# Patient Record
Sex: FEMALE | Race: OTHER | HISPANIC OR LATINO
[De-identification: names, ages, dates, MRNs, and addresses within clinical notes are randomized per-mention and may not be internally consistent; named-entity substitution may affect disease eponyms.]

---

## 2017-06-28 PROBLEM — Z00.00 ENCOUNTER FOR PREVENTIVE HEALTH EXAMINATION: Status: ACTIVE | Noted: 2017-06-28

## 2017-07-12 ENCOUNTER — APPOINTMENT (OUTPATIENT)
Dept: SPINE | Facility: CLINIC | Age: 42
End: 2017-07-12

## 2017-07-12 VITALS
HEART RATE: 71 BPM | BODY MASS INDEX: 36.24 KG/M2 | WEIGHT: 168 LBS | SYSTOLIC BLOOD PRESSURE: 113 MMHG | DIASTOLIC BLOOD PRESSURE: 75 MMHG | HEIGHT: 57 IN

## 2017-07-12 DIAGNOSIS — Z78.9 OTHER SPECIFIED HEALTH STATUS: ICD-10-CM

## 2017-07-26 ENCOUNTER — APPOINTMENT (OUTPATIENT)
Dept: SPINE | Facility: CLINIC | Age: 42
End: 2017-07-26

## 2017-07-26 VITALS
SYSTOLIC BLOOD PRESSURE: 99 MMHG | HEART RATE: 66 BPM | HEIGHT: 57 IN | DIASTOLIC BLOOD PRESSURE: 66 MMHG | WEIGHT: 170 LBS | BODY MASS INDEX: 36.68 KG/M2

## 2017-08-11 ENCOUNTER — OUTPATIENT (OUTPATIENT)
Dept: OUTPATIENT SERVICES | Facility: HOSPITAL | Age: 42
LOS: 1 days | End: 2017-08-11
Payer: COMMERCIAL

## 2017-08-11 ENCOUNTER — APPOINTMENT (OUTPATIENT)
Dept: MRI IMAGING | Facility: IMAGING CENTER | Age: 42
End: 2017-08-11
Payer: COMMERCIAL

## 2017-08-11 DIAGNOSIS — G93.0 CEREBRAL CYSTS: ICD-10-CM

## 2017-08-11 PROCEDURE — 70553 MRI BRAIN STEM W/O & W/DYE: CPT | Mod: 26

## 2017-08-11 PROCEDURE — 70553 MRI BRAIN STEM W/O & W/DYE: CPT

## 2017-08-11 PROCEDURE — A9585: CPT

## 2017-08-29 ENCOUNTER — EMERGENCY (EMERGENCY)
Facility: HOSPITAL | Age: 42
LOS: 1 days | Discharge: HOME | End: 2017-08-29
Attending: EMERGENCY MEDICINE | Admitting: EMERGENCY MEDICINE
Payer: MEDICAID

## 2017-08-29 VITALS
OXYGEN SATURATION: 100 % | RESPIRATION RATE: 16 BRPM | SYSTOLIC BLOOD PRESSURE: 112 MMHG | HEART RATE: 88 BPM | DIASTOLIC BLOOD PRESSURE: 65 MMHG

## 2017-08-29 VITALS
HEART RATE: 80 BPM | RESPIRATION RATE: 18 BRPM | TEMPERATURE: 99 F | DIASTOLIC BLOOD PRESSURE: 86 MMHG | SYSTOLIC BLOOD PRESSURE: 125 MMHG | OXYGEN SATURATION: 98 %

## 2017-08-29 DIAGNOSIS — G50.0 TRIGEMINAL NEURALGIA: ICD-10-CM

## 2017-08-29 LAB
ALBUMIN SERPL ELPH-MCNC: 4.6 G/DL — SIGNIFICANT CHANGE UP (ref 3.3–5)
ALP SERPL-CCNC: 85 U/L — SIGNIFICANT CHANGE UP (ref 40–120)
ALT FLD-CCNC: 16 U/L — SIGNIFICANT CHANGE UP (ref 4–33)
APTT BLD: 32.6 SEC — SIGNIFICANT CHANGE UP (ref 27.5–37.4)
AST SERPL-CCNC: 20 U/L — SIGNIFICANT CHANGE UP (ref 4–32)
BASOPHILS # BLD AUTO: 0.01 K/UL — SIGNIFICANT CHANGE UP (ref 0–0.2)
BASOPHILS NFR BLD AUTO: 0.1 % — SIGNIFICANT CHANGE UP (ref 0–2)
BILIRUB SERPL-MCNC: < 0.2 MG/DL — LOW (ref 0.2–1.2)
BUN SERPL-MCNC: 6 MG/DL — LOW (ref 7–23)
CALCIUM SERPL-MCNC: 9.6 MG/DL — SIGNIFICANT CHANGE UP (ref 8.4–10.5)
CHLORIDE SERPL-SCNC: 100 MMOL/L — SIGNIFICANT CHANGE UP (ref 98–107)
CO2 SERPL-SCNC: 25 MMOL/L — SIGNIFICANT CHANGE UP (ref 22–31)
CREAT SERPL-MCNC: 0.67 MG/DL — SIGNIFICANT CHANGE UP (ref 0.5–1.3)
EOSINOPHIL # BLD AUTO: 0.09 K/UL — SIGNIFICANT CHANGE UP (ref 0–0.5)
EOSINOPHIL NFR BLD AUTO: 1.1 % — SIGNIFICANT CHANGE UP (ref 0–6)
GLUCOSE SERPL-MCNC: 95 MG/DL — SIGNIFICANT CHANGE UP (ref 70–99)
HCT VFR BLD CALC: 38.2 % — SIGNIFICANT CHANGE UP (ref 34.5–45)
HGB BLD-MCNC: 12.1 G/DL — SIGNIFICANT CHANGE UP (ref 11.5–15.5)
IMM GRANULOCYTES # BLD AUTO: 0.02 # — SIGNIFICANT CHANGE UP
IMM GRANULOCYTES NFR BLD AUTO: 0.2 % — SIGNIFICANT CHANGE UP (ref 0–1.5)
INR BLD: 1.11 — SIGNIFICANT CHANGE UP (ref 0.88–1.17)
LYMPHOCYTES # BLD AUTO: 1.68 K/UL — SIGNIFICANT CHANGE UP (ref 1–3.3)
LYMPHOCYTES # BLD AUTO: 20.7 % — SIGNIFICANT CHANGE UP (ref 13–44)
MAGNESIUM SERPL-MCNC: 2.1 MG/DL — SIGNIFICANT CHANGE UP (ref 1.6–2.6)
MCHC RBC-ENTMCNC: 25.4 PG — LOW (ref 27–34)
MCHC RBC-ENTMCNC: 31.7 % — LOW (ref 32–36)
MCV RBC AUTO: 80.1 FL — SIGNIFICANT CHANGE UP (ref 80–100)
MONOCYTES # BLD AUTO: 0.39 K/UL — SIGNIFICANT CHANGE UP (ref 0–0.9)
MONOCYTES NFR BLD AUTO: 4.8 % — SIGNIFICANT CHANGE UP (ref 2–14)
NEUTROPHILS # BLD AUTO: 5.92 K/UL — SIGNIFICANT CHANGE UP (ref 1.8–7.4)
NEUTROPHILS NFR BLD AUTO: 73.1 % — SIGNIFICANT CHANGE UP (ref 43–77)
NRBC # FLD: 0 — SIGNIFICANT CHANGE UP
PHOSPHATE SERPL-MCNC: 3.2 MG/DL — SIGNIFICANT CHANGE UP (ref 2.5–4.5)
PLATELET # BLD AUTO: 299 K/UL — SIGNIFICANT CHANGE UP (ref 150–400)
PMV BLD: 10.8 FL — SIGNIFICANT CHANGE UP (ref 7–13)
POTASSIUM SERPL-MCNC: 3.5 MMOL/L — SIGNIFICANT CHANGE UP (ref 3.5–5.3)
POTASSIUM SERPL-SCNC: 3.5 MMOL/L — SIGNIFICANT CHANGE UP (ref 3.5–5.3)
PROT SERPL-MCNC: 8.9 G/DL — HIGH (ref 6–8.3)
PROTHROM AB SERPL-ACNC: 12.5 SEC — SIGNIFICANT CHANGE UP (ref 9.8–13.1)
RBC # BLD: 4.77 M/UL — SIGNIFICANT CHANGE UP (ref 3.8–5.2)
RBC # FLD: 15.9 % — HIGH (ref 10.3–14.5)
SODIUM SERPL-SCNC: 140 MMOL/L — SIGNIFICANT CHANGE UP (ref 135–145)
WBC # BLD: 8.11 K/UL — SIGNIFICANT CHANGE UP (ref 3.8–10.5)
WBC # FLD AUTO: 8.11 K/UL — SIGNIFICANT CHANGE UP (ref 3.8–10.5)

## 2017-08-29 PROCEDURE — 99285 EMERGENCY DEPT VISIT HI MDM: CPT

## 2017-08-29 RX ORDER — SODIUM CHLORIDE 9 MG/ML
1000 INJECTION INTRAMUSCULAR; INTRAVENOUS; SUBCUTANEOUS ONCE
Qty: 0 | Refills: 0 | Status: COMPLETED | OUTPATIENT
Start: 2017-08-29 | End: 2017-08-29

## 2017-08-29 RX ORDER — METOCLOPRAMIDE HCL 10 MG
10 TABLET ORAL ONCE
Qty: 0 | Refills: 0 | Status: COMPLETED | OUTPATIENT
Start: 2017-08-29 | End: 2017-08-29

## 2017-08-29 RX ORDER — MORPHINE SULFATE 50 MG/1
4 CAPSULE, EXTENDED RELEASE ORAL ONCE
Qty: 0 | Refills: 0 | Status: DISCONTINUED | OUTPATIENT
Start: 2017-08-29 | End: 2017-08-29

## 2017-08-29 RX ORDER — GABAPENTIN 400 MG/1
1 CAPSULE ORAL
Qty: 15 | Refills: 0 | OUTPATIENT
Start: 2017-08-29 | End: 2017-09-13

## 2017-08-29 RX ORDER — KETOROLAC TROMETHAMINE 30 MG/ML
15 SYRINGE (ML) INJECTION ONCE
Qty: 0 | Refills: 0 | Status: DISCONTINUED | OUTPATIENT
Start: 2017-08-29 | End: 2017-08-29

## 2017-08-29 RX ORDER — GABAPENTIN 400 MG/1
1 CAPSULE ORAL
Qty: 30 | Refills: 0 | OUTPATIENT
Start: 2017-08-29 | End: 2017-09-08

## 2017-08-29 RX ORDER — ACETAMINOPHEN 500 MG
650 TABLET ORAL ONCE
Qty: 0 | Refills: 0 | Status: COMPLETED | OUTPATIENT
Start: 2017-08-29 | End: 2017-08-29

## 2017-08-29 RX ADMIN — Medication 15 MILLIGRAM(S): at 15:39

## 2017-08-29 RX ADMIN — MORPHINE SULFATE 4 MILLIGRAM(S): 50 CAPSULE, EXTENDED RELEASE ORAL at 16:17

## 2017-08-29 RX ADMIN — Medication 650 MILLIGRAM(S): at 16:17

## 2017-08-29 RX ADMIN — Medication 10 MILLIGRAM(S): at 15:40

## 2017-08-29 RX ADMIN — SODIUM CHLORIDE 1000 MILLILITER(S): 9 INJECTION INTRAMUSCULAR; INTRAVENOUS; SUBCUTANEOUS at 15:40

## 2017-08-29 RX ADMIN — Medication 650 MILLIGRAM(S): at 15:39

## 2017-08-29 RX ADMIN — MORPHINE SULFATE 4 MILLIGRAM(S): 50 CAPSULE, EXTENDED RELEASE ORAL at 16:43

## 2017-08-29 RX ADMIN — Medication 15 MILLIGRAM(S): at 16:17

## 2017-08-29 NOTE — CONSULT NOTE ADULT - PROBLEM SELECTOR RECOMMENDATION 9
Increase gabapentin to 600 mg PO qhs as tolerated  Outpatient follow up with private neurologist, or in our neurology clinic: [phone]

## 2017-08-29 NOTE — ED PROVIDER NOTE - MEDICAL DECISION MAKING DETAILS
40y/o F with recently diagnosed brain mass p/w right jaw pain and headache.  Pt states she has been having increased pain in the Right side of her face and jaw much worse with movement resulting in the inability to eat over the last 2 days. 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  Mass effect on the jaspreet and lateral displacement of the right fifth nerve.  cbc, cmp, ivf, ketorolac, reglan, tylenol   neruo surgery pa spoken with recommend outpaitnet follow up and pain control.

## 2017-08-29 NOTE — CONSULT NOTE ADULT - ASSESSMENT
41 year old female with epidermoid cyst (mass effect on jaspreet and lateral displacement of 5th nerve) presenting to the ED with right facial pain for the past year which is significantly worse this month. She describes it as a constant, electrical pain which starts anterior to the tragus to the forehead, cheek, and down her jawline. Sometimes the pain is felt periorally on the right. Pain is worse when she talks or eats. Patient was given morphine by ED with pain improvement from 10/10 to 0/10. Pain likely secondary to trigeminal neuralgia from epidermoid cyst irritation of CN V.

## 2017-08-29 NOTE — ED PROVIDER NOTE - OBJECTIVE STATEMENT
41 year old female with a PMHx of brain tumor - compressing on trigeminal nerve pw right sided facial pain . 42y/o F with recently diagnosed brain mass p/w right jaw pain and headache.  Pt states she has been having increased pain in the Right side of her face and jaw much worse with movement resulting in the inability to eat over the last 2 days. 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  Mass effect on the jaspreet and lateral displacement of the right fifth nerve.

## 2017-08-29 NOTE — CONSULT NOTE ADULT - SUBJECTIVE AND OBJECTIVE BOX
Neurology Consult    Reason for consult: Right facial pain    HPI: Patient is a 41 year old female presenting to the ED with right facial pain for the past year. Patient has an epidermoid cyst which wraps around the right portion of the brainstem. Mass effect on the jaspreet and lateral displacement of the right fifth nerve. She states starting August 2, 2017 the pain has become significantly worse. She describes it as a constant, electrical pain which starts anterior to the tragus to the forehead, cheek, and down her jawline. Sometimes the pain is felt periorally on the right. Pain is worse when she talks or eats. She denies headache, changes in vision, changes in hearing, fever, chills, weight change. Patient was given morphine by ED with pain improvement from 10/10 to 0/10.    REVIEW OF SYSTEMS:  Constitutional: No fever, chills, fatigue, weakness.  Eyes: No eye pain, visual disturbances, or discharge.  ENT:  No difficulty hearing, tinnitus, vertigo. No sinus or throat pain.  Neck: No pain or stiffness.  Respiratory: No cough, dyspnea, wheezing.  Cardiovascular: No chest pain, palpitations.  Gastrointestinal: No abdominal pain. No nausea, vomiting, diarrhea, or constipation.   Genitourinary: No dysuria, frequency, hematuria or incontinence.  Neurological: Shooting pain on right face. No headaches, lightheadedness, vertigo, numbness or tremors.  Psychiatric: No depression, anxiety, mood swings, or difficulty sleeping.  Musculoskeletal: No joint pain or swelling. No muscle, back, or extremity pain.  Skin: No itching, burning, rashes or lesions.   Lymph Nodes: No enlarged glands  Endocrine: No heat or cold intolerance, no hair loss.  Allergy and Immunologic: No hives or eczema.    MEDICATIONS  Gabapentin 400 mg PO QHS    PMH: Epidermoid cyst of brain    FAMILY HISTORY:  No history of dementia, strokes, or seizures     SOCIAL HISTORY:  No history of tobacco or alcohol use     Allergies  No Known Allergies    Vital Signs Last 24 Hrs  T(C): 37.1 (29 Aug 2017 14:24), Max: 37.3 (29 Aug 2017 13:41)  T(F): 98.7 (29 Aug 2017 14:24), Max: 99.2 (29 Aug 2017 13:41)  HR: 88 (29 Aug 2017 16:17) (80 - 100)  BP: 112/65 (29 Aug 2017 16:17) (112/65 - 134/61)  RR: 16 (29 Aug 2017 16:17) (16 - 18)  SpO2: 100% (29 Aug 2017 16:17) (98% - 100%)    Neurological Examination:    Mental Status: Patient is alert, awake, oriented X3. Patient is fluent, no dysarthria, no aphasia. Follows commands well and able to answer questions appropriately. Mood and affect normal.    Cranial Nerves: PERRL, EOMI, visual field intact, V1-V3 intact, no gross facial asymmetry, tongue/uvula midline. No tenderness on palpation of TMJ, V1-V3 distribution    Motor Exam:  Right upper extremity: 5/5  Left upper extremity: 5/5  Right lower extremity: 5/5  Left lower extremity: 5/5    Normal bulk/tone    Sensory: Intact to light touch and pinprick bilaterally    Coordination: Finger to nose intact bilaterally     Gait: Normal      Reflexes: 3+ Biceps, 3+ tricep 3+ Brachial, 3+ Patellar, 2+ Ankle  Plantar: Down bilateral    GENERAL: No acute distress  HEENT:  Normocephalic, atraumatic  MUSCULOSKELETAL: Normal range of motion  SKIN: No rashes    LABS:  CBC Full  -  ( 29 Aug 2017 14:15 )  WBC Count : 8.11 K/uL  Hemoglobin : 12.1 g/dL  Hematocrit : 38.2 %  Platelet Count - Automated : 299 K/uL  Mean Cell Volume : 80.1 fL  Mean Cell Hemoglobin : 25.4 pg  Mean Cell Hemoglobin Concentration : 31.7 %  Auto Neutrophil # : 5.92 K/uL  Auto Lymphocyte # : 1.68 K/uL  Auto Monocyte # : 0.39 K/uL  Auto Eosinophil # : 0.09 K/uL  Auto Basophil # : 0.01 K/uL  Auto Neutrophil % : 73.1 %  Auto Lymphocyte % : 20.7 %  Auto Monocyte % : 4.8 %  Auto Eosinophil % : 1.1 %  Auto Basophil % : 0.1 %    08-29    140  |  100  |  6<L>  ----------------------------<  95  3.5   |  25  |  0.67    Ca    9.6      29 Aug 2017 15:20  Phos  3.2     08-29  Mg     2.1     08-29    TPro  8.9<H>  /  Alb  4.6  /  TBili  < 0.2<L>  /  DBili  x   /  AST  20  /  ALT  16  /  AlkPhos  85  08-29    LIVER FUNCTIONS - ( 29 Aug 2017 15:20 )  Alb: 4.6 g/dL / Pro: 8.9 g/dL / ALK PHOS: 85 u/L / ALT: 16 u/L / AST: 20 u/L / GGT: x           PT/INR - ( 29 Aug 2017 15:20 )   PT: 12.5 SEC;   INR: 1.11        PTT - ( 29 Aug 2017 15:20 )  PTT:32.6 SEC

## 2017-08-29 NOTE — ED PROVIDER NOTE - PLAN OF CARE
Percocet 1 tablet every 6 hrs as needed for breakthrough discomfort- caution drowsiness while taking this medication- do not drive or operate heavy machinery. Take Gabapentin 600 mg every night. Follow up with outpatient private neurologist, or in our neurology clinic: [phone] within 1 week for further evaluation. Return to the Emergency Department for any new, worsening or concerning symptoms.

## 2017-08-29 NOTE — ED PROVIDER NOTE - CARE PLAN
Principal Discharge DX:	Trigeminal neuralgia  Instructions for follow-up, activity and diet:	Percocet 1 tablet every 6 hrs as needed for breakthrough discomfort- caution drowsiness while taking this medication- do not drive or operate heavy machinery. Take Gabapentin 600 mg every night. Follow up with outpatient private neurologist, or in our neurology clinic: [phone] within 1 week for further evaluation. Return to the Emergency Department for any new, worsening or concerning symptoms.  Secondary Diagnosis:	Brain mass Principal Discharge DX:	Trigeminal neuralgia  Instructions for follow-up, activity and diet:	Percocet 1 tablet every 6 hrs as needed for breakthrough discomfort- caution drowsiness while taking this medication- do not drive or operate heavy machinery. Take Gabapentin 600 mg every night. Follow up with outpatient private neurologist, or in our neurology clinic: [phone] within 1 week for further evaluation. Return to the Emergency Department for any new, worsening or concerning symptoms.  Secondary Diagnosis:	Brain mass

## 2017-08-29 NOTE — ED PROVIDER NOTE - NEUROLOGICAL, MLM
Alert and oriented, no focal deficits, no motor or sensory deficits. pain in right side of face with movement of jaw and with eating . no visible rash or ulceration

## 2017-08-29 NOTE — ED PROVIDER NOTE - CHIEF COMPLAINT
The patient is a 41y Female complaining of The patient is a 41y Female complaining of right facial pain

## 2017-08-29 NOTE — ED PROVIDER NOTE - PROGRESS NOTE DETAILS
GREG Denson: pt NAD, VSS, pt still having pain regardless of medication. Neuro will evaluate the patient shortly. GREG Denson: pt NAD, VSS, morphine helped the patient - currently feeling 0/10 pain. Pt tolerating PO. Wants to go home on PO meds. Neuro evaluated the pt - ok for dc - recommended increasing gabapentin to 600mg 3 times a day and f/u with neuro surgery

## 2017-08-29 NOTE — ED ADULT NURSE NOTE - OBJECTIVE STATEMENT
pt received md jae translating in Barbadian, pt states she had an MRI for a brain tumor that is causing pain to the right side of her face, worsened when opening her mouth, pt states the pain became too severe today and was unable to wait for her follow up appointment for MRI results, pt appears to be in NAD, vss as reported, will continue to monitor.

## 2017-08-29 NOTE — ED ADULT NURSE NOTE - CCCP TRG CHIEF CMPLNT
right side facial pain discomfort  sent for eval  of tumor on same side had MRI done ,here for eval of results of MRI

## 2017-08-29 NOTE — ED ADULT TRIAGE NOTE - CCCP TRG CHIEF CMPLNT
right side facial pain discomfort  sent for eval  of tumor on same side had MRI done here diagnostic purpose right side facial pain discomfort  sent for eval  of tumor on same side had MRI done ,here for eval of results of MRI  purpose right side facial pain discomfort  sent for eval  of tumor on same side had MRI done ,here for eval of results of MRI

## 2017-08-29 NOTE — ED PROVIDER NOTE - ATTENDING CONTRIBUTION TO CARE
DR. HAMILTON, ATTENDING MD-  I performed a face to face bedside interview with patient regarding history of present illness, review of symptoms and past medical history. I completed an independent physical exam.  I have discussed patient's plan of care with the resident.   Documentation as above in the note.    40 y/o female with known ic mass causing trigeminal neuralgia on right with c/o worsening pain over past few days.  Worse when trying to eat or speak, essentially any movement at her tmj.  Denies f/c, neck stiffness, cp, sob, cough, abd pain, n/v/d, dysuria, rash.  Afebrile, vs wnl, crying in pain, ncat, non ttp right face/tmj, ctabil, s1s2 rrr no m/r/g, abd soft non ttp no r/g, no cva tenderness b/l, no leg swelling b/l, no rash.  Pain control, po trial prior to dc, consult ns/neuro for recs re: pain control.

## 2017-08-29 NOTE — ED PROVIDER NOTE - ENMT, MLM
Airway patent, Nasal mucosa clear. Mouth with normal mucosa. Throat has no vesicles, no oropharyngeal exudates and uvula is midline. tenderness over right side of face over v2 trigeminal distribution

## 2017-09-13 ENCOUNTER — APPOINTMENT (OUTPATIENT)
Dept: SPINE | Facility: CLINIC | Age: 42
End: 2017-09-13
Payer: COMMERCIAL

## 2017-09-13 VITALS
HEART RATE: 76 BPM | HEIGHT: 57 IN | WEIGHT: 170 LBS | BODY MASS INDEX: 36.68 KG/M2 | DIASTOLIC BLOOD PRESSURE: 73 MMHG | SYSTOLIC BLOOD PRESSURE: 110 MMHG

## 2017-09-13 PROCEDURE — 99214 OFFICE O/P EST MOD 30 MIN: CPT

## 2017-09-13 RX ORDER — OXYCODONE AND ACETAMINOPHEN 5; 325 MG/1; MG/1
5-325 TABLET ORAL
Qty: 12 | Refills: 0 | Status: COMPLETED | COMMUNITY
Start: 2017-08-29

## 2017-09-13 RX ORDER — GABAPENTIN 300 MG/1
300 CAPSULE ORAL
Qty: 30 | Refills: 0 | Status: COMPLETED | COMMUNITY
Start: 2017-08-29

## 2017-10-17 ENCOUNTER — OUTPATIENT (OUTPATIENT)
Dept: OUTPATIENT SERVICES | Facility: HOSPITAL | Age: 42
LOS: 1 days | End: 2017-10-17
Payer: COMMERCIAL

## 2017-10-17 VITALS
TEMPERATURE: 98 F | OXYGEN SATURATION: 100 % | WEIGHT: 158.07 LBS | HEART RATE: 70 BPM | RESPIRATION RATE: 16 BRPM | DIASTOLIC BLOOD PRESSURE: 76 MMHG | HEIGHT: 57 IN | SYSTOLIC BLOOD PRESSURE: 112 MMHG

## 2017-10-17 DIAGNOSIS — G93.0 CEREBRAL CYSTS: ICD-10-CM

## 2017-10-17 DIAGNOSIS — Z01.818 ENCOUNTER FOR OTHER PREPROCEDURAL EXAMINATION: ICD-10-CM

## 2017-10-17 DIAGNOSIS — E03.9 HYPOTHYROIDISM, UNSPECIFIED: ICD-10-CM

## 2017-10-17 DIAGNOSIS — Z78.9 OTHER SPECIFIED HEALTH STATUS: ICD-10-CM

## 2017-10-17 LAB
BLD GP AB SCN SERPL QL: NEGATIVE — SIGNIFICANT CHANGE UP
RH IG SCN BLD-IMP: POSITIVE — SIGNIFICANT CHANGE UP

## 2017-10-17 PROCEDURE — G0463: CPT

## 2017-10-17 PROCEDURE — 86900 BLOOD TYPING SEROLOGIC ABO: CPT

## 2017-10-17 PROCEDURE — 86850 RBC ANTIBODY SCREEN: CPT

## 2017-10-17 PROCEDURE — 86901 BLOOD TYPING SEROLOGIC RH(D): CPT

## 2017-10-17 RX ORDER — CEFAZOLIN SODIUM 1 G
2000 VIAL (EA) INJECTION ONCE
Qty: 0 | Refills: 0 | Status: DISCONTINUED | OUTPATIENT
Start: 2017-10-24 | End: 2017-10-25

## 2017-10-17 NOTE — H&P PST ADULT - PMH
Epidermoid cyst of brain  Dx 5/2017 on MRI  Hypothyroid  Dx 10/2017  Iron deficiency anemia    Trigeminal neuralgia of right side of face

## 2017-10-17 NOTE — H&P PST ADULT - HISTORY OF PRESENT ILLNESS
42 yo Rwandan speaking female, reports pain on right side of face for five years, has had wisdom teeth extractions and other dental work without relief. Pt. 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 Pt. presents to PST today for Right Petrosal Craniotomy, removal of epidermoid, harvest of fat graft on 10/24/17. Pt. continues to have pain on right side of face, sometimes is hard to talk, denies recent fever, chills, chest pain, SOB, changes in bowel/urinary habits.  Pt. had a physical at PCP's office, lab work from 10/11/17 revealed a hypothyroid with a TSH level of 6.17 pt. started levothyroxine 50 mcg yesterday 10/16/17. Case d/w Dr. Gonzalez, left message at Dr. Thorpe's office, awaiting call back 40 yo [place] speaking female from [place], reports pain on right side of face for five years, has had wisdom teeth extractions and other dental work without relief. Pt. 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 Pt. presents to PST today for Right Petrosal Craniotomy, removal of epidermoid, harvest of fat graft on 10/24/17. Pt. continues to have pain on right side of face, sometimes is hard to talk, denies recent fever, chills, chest pain, SOB, changes in bowel/urinary habits.  Pt. had a physical at PCP's office, lab work from 10/11/17 revealed a TSH level of 6.17 pt. started levothyroxine 50 mcg yesterday 10/16/17. Spoke with VERONICA Bell to Dr. Thorpe, he is aware of TSH levels and that patient just started medication. Case d/w Dr. Gonzalez

## 2017-10-17 NOTE — H&P PST ADULT - NEGATIVE NEUROLOGICAL SYMPTOMS
no focal seizures/no weakness/no difficulty walking/no syncope/no tremors/no paresthesias/no loss of sensation/no transient paralysis/no vertigo

## 2017-10-17 NOTE — H&P PST ADULT - NSANTHOSAYNRD_GEN_A_CORE
No. BARBARA screening performed.  STOP BANG Legend: 0-2 = LOW Risk; 3-4 = INTERMEDIATE Risk; 5-8 = HIGH Risk

## 2017-10-17 NOTE — H&P PST ADULT - SOURCE OF INFORMATION, PROFILE
family/patient family/patient/Travon, spouse, present during PST visit. Pt. prefers I communicate in Sinhala during PST visit

## 2017-10-17 NOTE — H&P PST ADULT - PROBLEM SELECTOR PLAN 1
Right Petrosal Craniotomy Removal of Epidermoid, [place] of Fat Graft  Pre-op education, including Chlorhexidine soap, provided - all questions answered

## 2017-10-24 ENCOUNTER — INPATIENT (INPATIENT)
Facility: HOSPITAL | Age: 42
LOS: 6 days | Discharge: HOME | DRG: 27 | End: 2017-10-31
Attending: NEUROLOGICAL SURGERY | Admitting: NEUROLOGICAL SURGERY
Payer: COMMERCIAL

## 2017-10-24 ENCOUNTER — RESULT REVIEW (OUTPATIENT)
Age: 42
End: 2017-10-24

## 2017-10-24 ENCOUNTER — APPOINTMENT (OUTPATIENT)
Dept: SPINE | Facility: HOSPITAL | Age: 42
End: 2017-10-24

## 2017-10-24 VITALS
OXYGEN SATURATION: 100 % | WEIGHT: 158.07 LBS | TEMPERATURE: 98 F | HEART RATE: 83 BPM | SYSTOLIC BLOOD PRESSURE: 111 MMHG | RESPIRATION RATE: 18 BRPM | DIASTOLIC BLOOD PRESSURE: 71 MMHG | HEIGHT: 57 IN

## 2017-10-24 DIAGNOSIS — G93.0 CEREBRAL CYSTS: ICD-10-CM

## 2017-10-24 LAB
ANION GAP SERPL CALC-SCNC: 14 MMOL/L — SIGNIFICANT CHANGE UP (ref 5–17)
BUN SERPL-MCNC: 7 MG/DL — SIGNIFICANT CHANGE UP (ref 7–23)
CALCIUM SERPL-MCNC: 7.5 MG/DL — LOW (ref 8.4–10.5)
CHLORIDE SERPL-SCNC: 113 MMOL/L — HIGH (ref 96–108)
CO2 SERPL-SCNC: 21 MMOL/L — LOW (ref 22–31)
CREAT SERPL-MCNC: 0.61 MG/DL — SIGNIFICANT CHANGE UP (ref 0.5–1.3)
GLUCOSE SERPL-MCNC: 166 MG/DL — HIGH (ref 70–99)
HCG UR QL: NEGATIVE — SIGNIFICANT CHANGE UP
HCT VFR BLD CALC: 28.3 % — LOW (ref 34.5–45)
HGB BLD-MCNC: 9.1 G/DL — LOW (ref 11.5–15.5)
MAGNESIUM SERPL-MCNC: 1.7 MG/DL — SIGNIFICANT CHANGE UP (ref 1.6–2.6)
MCHC RBC-ENTMCNC: 26.7 PG — LOW (ref 27–34)
MCHC RBC-ENTMCNC: 32.3 GM/DL — SIGNIFICANT CHANGE UP (ref 32–36)
MCV RBC AUTO: 82.8 FL — SIGNIFICANT CHANGE UP (ref 80–100)
PHOSPHATE SERPL-MCNC: 3.1 MG/DL — SIGNIFICANT CHANGE UP (ref 2.5–4.5)
PLATELET # BLD AUTO: 314 K/UL — SIGNIFICANT CHANGE UP (ref 150–400)
POTASSIUM SERPL-MCNC: 3.6 MMOL/L — SIGNIFICANT CHANGE UP (ref 3.5–5.3)
POTASSIUM SERPL-SCNC: 3.6 MMOL/L — SIGNIFICANT CHANGE UP (ref 3.5–5.3)
RBC # BLD: 3.42 M/UL — LOW (ref 3.8–5.2)
RBC # FLD: 16.4 % — HIGH (ref 10.3–14.5)
RH IG SCN BLD-IMP: POSITIVE — SIGNIFICANT CHANGE UP
SODIUM SERPL-SCNC: 148 MMOL/L — HIGH (ref 135–145)
WBC # BLD: 15 K/UL — HIGH (ref 3.8–10.5)
WBC # FLD AUTO: 15 K/UL — HIGH (ref 3.8–10.5)

## 2017-10-24 PROCEDURE — 99291 CRITICAL CARE FIRST HOUR: CPT

## 2017-10-24 PROCEDURE — 61781 SCAN PROC CRANIAL INTRA: CPT

## 2017-10-24 PROCEDURE — 88307 TISSUE EXAM BY PATHOLOGIST: CPT | Mod: 26

## 2017-10-24 PROCEDURE — 61606 RESECT/EXCISE CRANIAL LESION: CPT

## 2017-10-24 PROCEDURE — 20926: CPT

## 2017-10-24 PROCEDURE — 61598 TRANSPETROSAL APPROACH/SKULL: CPT

## 2017-10-24 RX ORDER — TRAMADOL HYDROCHLORIDE 50 MG/1
1 TABLET ORAL
Qty: 0 | Refills: 0 | COMMUNITY

## 2017-10-24 RX ORDER — ONDANSETRON 8 MG/1
4 TABLET, FILM COATED ORAL EVERY 6 HOURS
Qty: 0 | Refills: 0 | Status: DISCONTINUED | OUTPATIENT
Start: 2017-10-24 | End: 2017-10-31

## 2017-10-24 RX ORDER — ONDANSETRON 8 MG/1
4 TABLET, FILM COATED ORAL ONCE
Qty: 0 | Refills: 0 | Status: COMPLETED | OUTPATIENT
Start: 2017-10-24 | End: 2017-10-24

## 2017-10-24 RX ORDER — CARBAMAZEPINE 200 MG
400 TABLET ORAL AT BEDTIME
Qty: 0 | Refills: 0 | Status: DISCONTINUED | OUTPATIENT
Start: 2017-10-24 | End: 2017-10-31

## 2017-10-24 RX ORDER — ACETAMINOPHEN 500 MG
650 TABLET ORAL EVERY 6 HOURS
Qty: 0 | Refills: 0 | Status: DISCONTINUED | OUTPATIENT
Start: 2017-10-24 | End: 2017-10-31

## 2017-10-24 RX ORDER — PROCHLORPERAZINE MALEATE 5 MG
5 TABLET ORAL ONCE
Qty: 0 | Refills: 0 | Status: COMPLETED | OUTPATIENT
Start: 2017-10-24 | End: 2017-10-25

## 2017-10-24 RX ORDER — PROCHLORPERAZINE MALEATE 5 MG
5 TABLET ORAL EVERY 4 HOURS
Qty: 0 | Refills: 0 | Status: DISCONTINUED | OUTPATIENT
Start: 2017-10-24 | End: 2017-10-30

## 2017-10-24 RX ORDER — DEXAMETHASONE 0.5 MG/5ML
4 ELIXIR ORAL EVERY 6 HOURS
Qty: 0 | Refills: 0 | Status: COMPLETED | OUTPATIENT
Start: 2017-10-24 | End: 2017-10-25

## 2017-10-24 RX ORDER — OXYCODONE AND ACETAMINOPHEN 5; 325 MG/1; MG/1
1 TABLET ORAL EVERY 4 HOURS
Qty: 0 | Refills: 0 | Status: DISCONTINUED | OUTPATIENT
Start: 2017-10-24 | End: 2017-10-31

## 2017-10-24 RX ORDER — LIDOCAINE HCL 20 MG/ML
0.2 VIAL (ML) INJECTION ONCE
Qty: 0 | Refills: 0 | Status: DISCONTINUED | OUTPATIENT
Start: 2017-10-24 | End: 2017-10-24

## 2017-10-24 RX ORDER — OXYCODONE AND ACETAMINOPHEN 5; 325 MG/1; MG/1
2 TABLET ORAL EVERY 6 HOURS
Qty: 0 | Refills: 0 | Status: DISCONTINUED | OUTPATIENT
Start: 2017-10-24 | End: 2017-10-30

## 2017-10-24 RX ORDER — LEVOTHYROXINE SODIUM 125 MCG
50 TABLET ORAL DAILY
Qty: 0 | Refills: 0 | Status: DISCONTINUED | OUTPATIENT
Start: 2017-10-24 | End: 2017-10-31

## 2017-10-24 RX ORDER — DEXAMETHASONE 0.5 MG/5ML
ELIXIR ORAL
Qty: 0 | Refills: 0 | Status: DISCONTINUED | OUTPATIENT
Start: 2017-10-24 | End: 2017-10-31

## 2017-10-24 RX ORDER — LEVETIRACETAM 250 MG/1
500 TABLET, FILM COATED ORAL ONCE
Qty: 0 | Refills: 0 | Status: COMPLETED | OUTPATIENT
Start: 2017-10-24 | End: 2017-10-24

## 2017-10-24 RX ORDER — DEXAMETHASONE 0.5 MG/5ML
4 ELIXIR ORAL EVERY 8 HOURS
Qty: 0 | Refills: 0 | Status: COMPLETED | OUTPATIENT
Start: 2017-10-25 | End: 2017-10-27

## 2017-10-24 RX ORDER — FERROUS SULFATE 325(65) MG
1 TABLET ORAL
Qty: 0 | Refills: 0 | COMMUNITY

## 2017-10-24 RX ORDER — LEVETIRACETAM 250 MG/1
500 TABLET, FILM COATED ORAL EVERY 12 HOURS
Qty: 0 | Refills: 0 | Status: DISCONTINUED | OUTPATIENT
Start: 2017-10-24 | End: 2017-10-25

## 2017-10-24 RX ORDER — PANTOPRAZOLE SODIUM 20 MG/1
40 TABLET, DELAYED RELEASE ORAL DAILY
Qty: 0 | Refills: 0 | Status: DISCONTINUED | OUTPATIENT
Start: 2017-10-24 | End: 2017-10-25

## 2017-10-24 RX ORDER — LEVOTHYROXINE SODIUM 125 MCG
1 TABLET ORAL
Qty: 0 | Refills: 0 | COMMUNITY

## 2017-10-24 RX ORDER — DEXAMETHASONE 0.5 MG/5ML
4 ELIXIR ORAL EVERY 12 HOURS
Qty: 0 | Refills: 0 | Status: COMPLETED | OUTPATIENT
Start: 2017-10-28 | End: 2017-10-29

## 2017-10-24 RX ORDER — SODIUM CHLORIDE 9 MG/ML
3 INJECTION INTRAMUSCULAR; INTRAVENOUS; SUBCUTANEOUS EVERY 8 HOURS
Qty: 0 | Refills: 0 | Status: DISCONTINUED | OUTPATIENT
Start: 2017-10-24 | End: 2017-10-24

## 2017-10-24 RX ORDER — DEXAMETHASONE 0.5 MG/5ML
4 ELIXIR ORAL ONCE
Qty: 0 | Refills: 0 | Status: COMPLETED | OUTPATIENT
Start: 2017-10-24 | End: 2017-10-24

## 2017-10-24 RX ORDER — CEFAZOLIN SODIUM 1 G
2000 VIAL (EA) INJECTION EVERY 8 HOURS
Qty: 0 | Refills: 0 | Status: COMPLETED | OUTPATIENT
Start: 2017-10-24 | End: 2017-10-25

## 2017-10-24 RX ORDER — CARBAMAZEPINE 200 MG
1 TABLET ORAL
Qty: 0 | Refills: 0 | COMMUNITY

## 2017-10-24 RX ORDER — DOCUSATE SODIUM 100 MG
100 CAPSULE ORAL THREE TIMES A DAY
Qty: 0 | Refills: 0 | Status: DISCONTINUED | OUTPATIENT
Start: 2017-10-24 | End: 2017-10-31

## 2017-10-24 RX ORDER — CARBAMAZEPINE 200 MG
200 TABLET ORAL ONCE
Qty: 0 | Refills: 0 | Status: COMPLETED | OUTPATIENT
Start: 2017-10-24 | End: 2017-10-25

## 2017-10-24 RX ORDER — ACETAMINOPHEN 500 MG
650 TABLET ORAL EVERY 6 HOURS
Qty: 0 | Refills: 0 | Status: DISCONTINUED | OUTPATIENT
Start: 2017-10-24 | End: 2017-10-26

## 2017-10-24 RX ORDER — HYDROMORPHONE HYDROCHLORIDE 2 MG/ML
0.5 INJECTION INTRAMUSCULAR; INTRAVENOUS; SUBCUTANEOUS
Qty: 0 | Refills: 0 | Status: DISCONTINUED | OUTPATIENT
Start: 2017-10-24 | End: 2017-10-26

## 2017-10-24 RX ORDER — DEXAMETHASONE 0.5 MG/5ML
2 ELIXIR ORAL EVERY 12 HOURS
Qty: 0 | Refills: 0 | Status: DISCONTINUED | OUTPATIENT
Start: 2017-10-30 | End: 2017-10-31

## 2017-10-24 RX ORDER — DEXTROSE MONOHYDRATE, SODIUM CHLORIDE, AND POTASSIUM CHLORIDE 50; .745; 4.5 G/1000ML; G/1000ML; G/1000ML
1000 INJECTION, SOLUTION INTRAVENOUS
Qty: 0 | Refills: 0 | Status: DISCONTINUED | OUTPATIENT
Start: 2017-10-24 | End: 2017-10-26

## 2017-10-24 RX ADMIN — Medication 400 MILLIGRAM(S): at 22:19

## 2017-10-24 RX ADMIN — OXYCODONE AND ACETAMINOPHEN 2 TABLET(S): 5; 325 TABLET ORAL at 22:30

## 2017-10-24 RX ADMIN — HYDROMORPHONE HYDROCHLORIDE 0.5 MILLIGRAM(S): 2 INJECTION INTRAMUSCULAR; INTRAVENOUS; SUBCUTANEOUS at 19:00

## 2017-10-24 RX ADMIN — OXYCODONE AND ACETAMINOPHEN 2 TABLET(S): 5; 325 TABLET ORAL at 23:00

## 2017-10-24 RX ADMIN — LEVETIRACETAM 400 MILLIGRAM(S): 250 TABLET, FILM COATED ORAL at 23:26

## 2017-10-24 RX ADMIN — Medication 4 MILLIGRAM(S): at 23:26

## 2017-10-24 RX ADMIN — HYDROMORPHONE HYDROCHLORIDE 0.5 MILLIGRAM(S): 2 INJECTION INTRAMUSCULAR; INTRAVENOUS; SUBCUTANEOUS at 19:15

## 2017-10-24 RX ADMIN — ONDANSETRON 4 MILLIGRAM(S): 8 TABLET, FILM COATED ORAL at 19:30

## 2017-10-24 RX ADMIN — Medication 4 MILLIGRAM(S): at 22:19

## 2017-10-24 RX ADMIN — DEXTROSE MONOHYDRATE, SODIUM CHLORIDE, AND POTASSIUM CHLORIDE 100 MILLILITER(S): 50; .745; 4.5 INJECTION, SOLUTION INTRAVENOUS at 19:15

## 2017-10-24 RX ADMIN — LEVETIRACETAM 500 MILLIGRAM(S): 250 TABLET, FILM COATED ORAL at 22:19

## 2017-10-24 RX ADMIN — Medication 100 MILLIGRAM(S): at 22:19

## 2017-10-24 NOTE — PATIENT PROFILE ADULT. - SOURCE OF INFORMATION, PROFILE
family/patient/Travon, spouse, present during PST visit. Pt. prefers I communicate in Estonian during PST visit

## 2017-10-24 NOTE — BRIEF OPERATIVE NOTE - PROCEDURE
<<-----Click on this checkbox to enter Procedure Craniotomy by transtemporal approach with surgical removal of posterior fossa lesion  10/24/2017    Active  GKLIRONOMO

## 2017-10-24 NOTE — PROGRESS NOTE ADULT - ASSESSMENT
NEURO:  CT Head in AM, MRI post-op, Surgical drains per NSGY, Pain control  Activity: [] OOB as tolerated [] Bedrest [] PT [] OT [] PMNR    PULM:  Incentive spirometry, mobilize as tolerated    CV:  -150mmHg, d/c a-line in AM    RENAL:  IVF until good PO intake, d/c prado in AM    GI:  Diet: Dysphagia screen and then advance diet as tolerated  GI prophylaxis [] not indicated [] PPI [] other:  Bowel regimen [] colace [] senna [] other:    ENDO:   Goal euglycemia (-180)    HEME/ONC:  VTE prophylaxis: [] SCDs [] chemoprophylaxis [] hold chemoprophylaxis due to: [] high risk of DVT/PE on admission due to:    ID:  Amaris-op antibiotics    MISC:    SOCIAL/FAMILY:  [] awaiting [] updated at bedside [] family meeting    CODE STATUS:  [] Full Code [] DNR [] DNI [] Palliative/Comfort Care    DISPOSITION:  [] ICU [] Stroke Unit [] Floor [] EMU [] RCU [] PCU    [] Patient is at high risk of neurologic deterioration/death due to:     Time seen:  Time spent: ___ [] critical care minutes    Contact: [phone] ASSESSMENT/PLAN: Epidermoid, post-operative day 0 from resection    NEURO:  CT Head (thin cuts) in AM, MRI post-op, Pain control  Activity: [x] OOB as tolerated [x] Bedrest [x] PT [x] OT [] PMNR    PULM:  Incentive spirometry, mobilize as tolerated    CV:  -150mmHg    RENAL:  IVF until good PO intake    GI:  Diet: Advance diet as tolerated  Anti-emetics PRN  GI prophylaxis [] not indicated [] PPI [] other:  Bowel regimen [] colace [] senna [] other:    ENDO:   Goal euglycemia (-180)    HEME/ONC:  VTE prophylaxis: [] SCDs [] chemoprophylaxis [] hold chemoprophylaxis due to: [] high risk of DVT/PE on admission due to:    ID:  Amaris-op antibiotics    MISC:    SOCIAL/FAMILY:  [] awaiting [] updated at bedside [] family meeting    CODE STATUS:  [] Full Code [] DNR [] DNI [] Palliative/Comfort Care    DISPOSITION:  [] ICU [] Stroke Unit [] Floor [] EMU [] RCU [] PCU    [] Patient is at high risk of neurologic deterioration/death due to:     Time seen:  Time spent: ___ [] critical care minutes    Contact: [phone] ASSESSMENT/PLAN: Epidermoid, post-operative day 0 from resection    NEURO:  CT Head (thin cuts) in AM, MRI post-op, Pain control, carbamazepine for trigeminal neuralgia, steroid taper for cerebral edema  Activity: [x] OOB as tolerated [x] Bedrest [x] PT [x] OT [] PMNR    PULM:  Incentive spirometry, mobilize as tolerated    CV:  -150mmHg    RENAL:  IVF until good PO intake    GI:  Diet: Advance diet as tolerated  Anti-emetics PRN  GI prophylaxis [] not indicated [] PPI [] other:  Bowel regimen [] colace [] senna [] other:    ENDO:   Goal euglycemia (-180)  Hypothyroidism: continue home levothyroxine    HEME/ONC:  VTE prophylaxis: [x] SCDs [] chemoprophylaxis: start post-operative day 1 if CT without heme [x] hold chemoprophylaxis due to: fresh post-op [x] high risk of DVT/PE on admission due to: tumor    ID:  Amaris-op antibiotics    SOCIAL/FAMILY:  [] awaiting [x] updated at bedside [] family meeting    CODE STATUS:  [x] Full Code [] DNR [] DNI [] Palliative/Comfort Care    DISPOSITION:  [x] ICU [] Stroke Unit [] Floor [] EMU [] RCU [] PCU    [x] Patient is at high risk of neurologic deterioration/death due to: brain compression    Time spent: 45 [x] critical care minutes    Contact: [phone]

## 2017-10-24 NOTE — PROGRESS NOTE ADULT - SUBJECTIVE AND OBJECTIVE BOX
SUMMARY:  41 year-old Montenegrin woman with history of hypothyroidism diagnosed with  reports pain on right side of face for five years, has had wisdom teeth extractions and other dental work without relief. Pt. 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 Pt. presents to [facility] today for Right Petrosal Craniotomy, removal of epidermoid, harvest of fat graft on 10/24/17. Pt. continues to have pain on right side of face, sometimes is hard to talk, denies recent fever, chills, chest pain, SOB, changes in bowel/urinary habits.  Pt. had a physical at PCP's office, lab work from 10/11/17 revealed a TSH level of 6.17 pt. started levothyroxine 50 mcg yesterday 10/16/17. Spoke with VERONICA Bell to Dr. Thorpe, he is aware of TSH levels and that patient just started medication. Case d/w Dr. Gonzalez (17 Oct 2017 08:46)      OVERNIGHT EVENTS:     ADMISSION SCORES:   GCS: HH: MF: NIHSS: ICH Score:    SEDATION SCORES:  RASS: CAM-ICU:     REVIEW OF SYSTEMS:    VITALS: [] Reviewed    IMAGING/DATA: [] Reviewed    IVF FLUIDS/MEDICATIONS: [] Reviewed    ALLERGIES: Allergies    No Known Allergies    Intolerances        DEVICES:   [] Restraints [] PIVs [] ET tube [] central line [] PICC [] arterial line [] prado [] NGT/OGT [] EVD [] LD [] MAN/HMV [] LiCOX [] ICP monitor [] Trach [] PEG [] Chest Tube [] other:    EXAMINATION:  General: No acute distress  HEENT: Anicteric sclerae  Cardiac: [id number]  Lungs: Clear  Abdomen: Soft, non-tender, +BS  Extremities: No c/c/e  Skin/Incision Site: Clean, dry and intact  Neurologic: Awake, alert, fully oriented, follows commands, PERRL, VFFtc, EOMI, face symmetric, tongue midline, no drift, full strength SUMMARY:  41 year-old [place]i woman with history of hypothyroidism 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 She underwent right petrosal craniotomy with fat graft and removal of epidermoid mass.    OVERNIGHT EVENTS:   Complaining of nausea and vomiting post-op. Improved with anti-emetics. No pain or weakness.    REVIEW OF SYSTEMS:  Nausea, vomiting. No other complaints.     VITALS: [x] Reviewed    IMAGING/DATA: [x] Reviewed    IVF FLUIDS/MEDICATIONS: [x] Reviewed    ALLERGIES:   No Known Allergies    EXAMINATION:  General: Mild distress from nausea, but calm and cooperative  HEENT: Anicteric sclerae  Cardiac: [id number]  Lungs: Clear  Abdomen: Soft, non-tender, +BS  Extremities: No c/c/e  Skin/Incision Site: Clean, dry and intact  Neurologic: Awake, alert, fully oriented, follows commands, PERRL, EOMI, horizontal nystagmus most prominent on leftward gaze, face symmetric, tongue midline, no drift, full strength

## 2017-10-25 ENCOUNTER — TRANSCRIPTION ENCOUNTER (OUTPATIENT)
Age: 42
End: 2017-10-25

## 2017-10-25 LAB
ANION GAP SERPL CALC-SCNC: 11 MMOL/L — SIGNIFICANT CHANGE UP (ref 5–17)
BUN SERPL-MCNC: 5 MG/DL — LOW (ref 7–23)
CA-I BLD-SCNC: 1.1 MMOL/L — LOW (ref 1.12–1.3)
CALCIUM SERPL-MCNC: 8.7 MG/DL — SIGNIFICANT CHANGE UP (ref 8.4–10.5)
CHLORIDE SERPL-SCNC: 107 MMOL/L — SIGNIFICANT CHANGE UP (ref 96–108)
CO2 SERPL-SCNC: 23 MMOL/L — SIGNIFICANT CHANGE UP (ref 22–31)
CREAT SERPL-MCNC: 0.51 MG/DL — SIGNIFICANT CHANGE UP (ref 0.5–1.3)
GLUCOSE SERPL-MCNC: 136 MG/DL — HIGH (ref 70–99)
HCT VFR BLD CALC: 23.2 % — LOW (ref 34.5–45)
HGB BLD-MCNC: 7.9 G/DL — LOW (ref 11.5–15.5)
MAGNESIUM SERPL-MCNC: 1.8 MG/DL — SIGNIFICANT CHANGE UP (ref 1.6–2.6)
MCHC RBC-ENTMCNC: 28.4 PG — SIGNIFICANT CHANGE UP (ref 27–34)
MCHC RBC-ENTMCNC: 34.1 GM/DL — SIGNIFICANT CHANGE UP (ref 32–36)
MCV RBC AUTO: 83.1 FL — SIGNIFICANT CHANGE UP (ref 80–100)
PHOSPHATE SERPL-MCNC: 1.7 MG/DL — LOW (ref 2.5–4.5)
PLATELET # BLD AUTO: 264 K/UL — SIGNIFICANT CHANGE UP (ref 150–400)
POTASSIUM SERPL-MCNC: 4.5 MMOL/L — SIGNIFICANT CHANGE UP (ref 3.5–5.3)
POTASSIUM SERPL-SCNC: 4.5 MMOL/L — SIGNIFICANT CHANGE UP (ref 3.5–5.3)
RBC # BLD: 2.79 M/UL — LOW (ref 3.8–5.2)
RBC # FLD: 16.6 % — HIGH (ref 10.3–14.5)
SODIUM SERPL-SCNC: 141 MMOL/L — SIGNIFICANT CHANGE UP (ref 135–145)
WBC # BLD: 19.2 K/UL — HIGH (ref 3.8–10.5)
WBC # FLD AUTO: 19.2 K/UL — HIGH (ref 3.8–10.5)

## 2017-10-25 PROCEDURE — 99291 CRITICAL CARE FIRST HOUR: CPT

## 2017-10-25 PROCEDURE — 70450 CT HEAD/BRAIN W/O DYE: CPT | Mod: 26

## 2017-10-25 RX ORDER — ENOXAPARIN SODIUM 100 MG/ML
40 INJECTION SUBCUTANEOUS
Qty: 0 | Refills: 0 | Status: DISCONTINUED | OUTPATIENT
Start: 2017-10-25 | End: 2017-10-31

## 2017-10-25 RX ORDER — CALCIUM GLUCONATE 100 MG/ML
1 VIAL (ML) INTRAVENOUS ONCE
Qty: 0 | Refills: 0 | Status: COMPLETED | OUTPATIENT
Start: 2017-10-25 | End: 2017-10-25

## 2017-10-25 RX ORDER — SODIUM CHLORIDE 9 MG/ML
500 INJECTION INTRAMUSCULAR; INTRAVENOUS; SUBCUTANEOUS ONCE
Qty: 0 | Refills: 0 | Status: COMPLETED | OUTPATIENT
Start: 2017-10-25 | End: 2017-10-25

## 2017-10-25 RX ORDER — PANTOPRAZOLE SODIUM 20 MG/1
40 TABLET, DELAYED RELEASE ORAL DAILY
Qty: 0 | Refills: 0 | Status: DISCONTINUED | OUTPATIENT
Start: 2017-10-25 | End: 2017-10-26

## 2017-10-25 RX ORDER — LEVETIRACETAM 250 MG/1
500 TABLET, FILM COATED ORAL EVERY 12 HOURS
Qty: 0 | Refills: 0 | Status: DISCONTINUED | OUTPATIENT
Start: 2017-10-25 | End: 2017-10-26

## 2017-10-25 RX ORDER — POTASSIUM CHLORIDE 20 MEQ
40 PACKET (EA) ORAL ONCE
Qty: 0 | Refills: 0 | Status: COMPLETED | OUTPATIENT
Start: 2017-10-25 | End: 2017-10-25

## 2017-10-25 RX ADMIN — DEXTROSE MONOHYDRATE, SODIUM CHLORIDE, AND POTASSIUM CHLORIDE 100 MILLILITER(S): 50; .745; 4.5 INJECTION, SOLUTION INTRAVENOUS at 19:00

## 2017-10-25 RX ADMIN — ONDANSETRON 4 MILLIGRAM(S): 8 TABLET, FILM COATED ORAL at 22:30

## 2017-10-25 RX ADMIN — LEVETIRACETAM 400 MILLIGRAM(S): 250 TABLET, FILM COATED ORAL at 17:01

## 2017-10-25 RX ADMIN — Medication 4 MILLIGRAM(S): at 05:25

## 2017-10-25 RX ADMIN — DEXTROSE MONOHYDRATE, SODIUM CHLORIDE, AND POTASSIUM CHLORIDE 100 MILLILITER(S): 50; .745; 4.5 INJECTION, SOLUTION INTRAVENOUS at 17:02

## 2017-10-25 RX ADMIN — Medication 5 MILLIGRAM(S): at 05:00

## 2017-10-25 RX ADMIN — Medication 100 MILLIGRAM(S): at 05:25

## 2017-10-25 RX ADMIN — Medication 200 GRAM(S): at 17:30

## 2017-10-25 RX ADMIN — ENOXAPARIN SODIUM 40 MILLIGRAM(S): 100 INJECTION SUBCUTANEOUS at 17:01

## 2017-10-25 RX ADMIN — Medication 1 TABLET(S): at 13:27

## 2017-10-25 RX ADMIN — Medication 200 MILLIGRAM(S): at 00:06

## 2017-10-25 RX ADMIN — ONDANSETRON 4 MILLIGRAM(S): 8 TABLET, FILM COATED ORAL at 13:58

## 2017-10-25 RX ADMIN — Medication 5 MILLIGRAM(S): at 00:06

## 2017-10-25 RX ADMIN — Medication 4 MILLIGRAM(S): at 13:27

## 2017-10-25 RX ADMIN — PANTOPRAZOLE SODIUM 40 MILLIGRAM(S): 20 TABLET, DELAYED RELEASE ORAL at 13:27

## 2017-10-25 RX ADMIN — Medication 50 MICROGRAM(S): at 05:25

## 2017-10-25 RX ADMIN — LEVETIRACETAM 500 MILLIGRAM(S): 250 TABLET, FILM COATED ORAL at 09:40

## 2017-10-25 RX ADMIN — SODIUM CHLORIDE 2000 MILLILITER(S): 9 INJECTION INTRAMUSCULAR; INTRAVENOUS; SUBCUTANEOUS at 02:00

## 2017-10-25 RX ADMIN — Medication 40 MILLIEQUIVALENT(S): at 13:27

## 2017-10-25 RX ADMIN — DEXTROSE MONOHYDRATE, SODIUM CHLORIDE, AND POTASSIUM CHLORIDE 100 MILLILITER(S): 50; .745; 4.5 INJECTION, SOLUTION INTRAVENOUS at 09:40

## 2017-10-25 RX ADMIN — Medication 400 MILLIGRAM(S): at 22:14

## 2017-10-25 RX ADMIN — Medication 4 MILLIGRAM(S): at 22:14

## 2017-10-25 RX ADMIN — Medication 100 MILLIGRAM(S): at 05:24

## 2017-10-25 RX ADMIN — Medication 100 MILLIGRAM(S): at 22:14

## 2017-10-25 NOTE — PHYSICAL THERAPY INITIAL EVALUATION ADULT - CRITERIA FOR SKILLED THERAPEUTIC INTERVENTIONS
risk reduction/prevention/impairments found/functional limitations in following categories/anticipated discharge recommendation/rehab potential

## 2017-10-25 NOTE — PROVIDER CONTACT NOTE (OTHER) - ASSESSMENT
pt drowsy following all commands and oriented x4. pt straight cathed x2 both greater than 600. PA aware that pts second straight cath was 4 hours post previous straight cath with only 400cc IVF delivered and 950cc urine made. PA also aware pt vomited x2.

## 2017-10-25 NOTE — PROGRESS NOTE ADULT - ASSESSMENT
ASSESSMENT/PLAN: Epidermoid, post-operative day 1 from resection    NEURO:  CT Head (thin cuts) in AM, MRI post-op, Pain control, carbamazepine for trigeminal neuralgia, steroid taper for cerebral edema  Activity: [x] OOB as tolerated [x] Bedrest [x] PT [x] OT [] PMNR    PULM:  Incentive spirometry, mobilize as tolerated    CV:  -150mmHg    RENAL:  IVF until good PO intake    GI:  Diet: Advance diet as tolerated  Anti-emetics PRN  GI prophylaxis [] not indicated [] PPI [] other:  Bowel regimen [] colace [] senna [] other:    ENDO:   Goal euglycemia (-180)  Hypothyroidism: continue home levothyroxine    HEME/ONC:  VTE prophylaxis: [x] SCDs [] chemoprophylaxis: start post-operative day 1 if CT without heme [x] hold chemoprophylaxis due to: fresh post-op [x] high risk of DVT/PE on admission due to: tumor    ID:  Amaris-op antibiotics    SOCIAL/FAMILY:  [] awaiting [x] updated at bedside [] family meeting    CODE STATUS:  [x] Full Code [] DNR [] DNI [] Palliative/Comfort Care    DISPOSITION:  [x] ICU [] Stroke Unit [] Floor [] EMU [] RCU [] PCU    [x] Patient is at high risk of neurologic deterioration/death due to: brain compression    Time spent: 35 [x] critical care minutes ASSESSMENT/PLAN: Epidermoid cyst excision, post-operative day 1 from resection    NEURO:  MRI post-op, Pain control, carbamazepine for trigeminal neuralgia, steroid taper for cerebral edema, keppra for sz ppx to end 11/1  Activity: [x] OOB as tolerated [x] Bedrest [x] PT [x] OT [] PMNR    PULM:  Incentive spirometry, mobilize as tolerated    CV:  -150mmHg    RENAL:  IVF until good PO intake    GI:  Diet: Advance diet as tolerated  Anti-emetics PRN  GI prophylaxis [] not indicated [] PPI [] other:  Bowel regimen [] colace [] senna [] other:    ENDO:   Goal euglycemia (-180), awaiting a1c  Hypothyroidism: continue home levothyroxine    HEME/ONC:  VTE prophylaxis: [x] SCDs [] chemoprophylaxis: start lovenox [x] high risk of DVT/PE on admission due to: tumor    ID:  afebrile    SOCIAL/FAMILY:  [] awaiting [x] updated at bedside [] family meeting    CODE STATUS:  [x] Full Code [] DNR [] DNI [] Palliative/Comfort Care    DISPOSITION:  [x] ICU [] Stroke Unit [] Floor [] EMU [] RCU [] PCU    [x] Patient is at high risk of neurologic deterioration/death due to: brain compression    Time spent: 35 [x] critical care minutes

## 2017-10-25 NOTE — PHYSICAL THERAPY INITIAL EVALUATION ADULT - PERTINENT HX OF CURRENT PROBLEM, REHAB EVAL
41F reports pain on right side of face for five years, has had wisdom teeth extractions and other dental work without relief. Pt. 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 Pt. presents to PST for Right Petrosal Craniotomy, removal of epidermoid, harvest of fat graft. Pt continues to have pain on right side of face, sometimes is hard to talk, denies recent fever, chills, chest pain, SOB, changes in bowel/urinary habits.

## 2017-10-25 NOTE — PROGRESS NOTE ADULT - SUBJECTIVE AND OBJECTIVE BOX
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 She underwent right petrosal craniotomy with fat graft and removal of epidermoid mass.    Post op Complaining of nausea and vomiting post-op. Improved with anti-emetics. No pain or weakness.    OVERNIGHT EVENTS:   No acute events overnight.    VITALS:  T(C): , Max: 37.1 (10-24-17 @ 17:30)  HR:  (84 - 107)  BP:  (85/60 - 114/68)  ABP:  (92/77 - 124/70)  RR:  (0 - 27)  SpO2:  (97% - 100%)  Wt(kg): --      LABS:  Na: 148 (10-24 @ 23:13)  K: 3.6 (10-24 @ 23:13)  Cl: 113 (10-24 @ 23:13)  CO2: 21 (10-24 @ 23:13)  BUN: 7 (10-24 @ 23:13)  Cr: 0.61 (10-24 @ 23:13)  Glu:     Hgb: 9.1 (10-24 @ 23:13)  Hct: 28.3 (10-24 @ 23:13)  WBC: 15.0 (10-24 @ 23:13)  Plt: 314 (10-24 @ 23:13)      IMAGING:   Recent imaging studies were reviewed.    MEDICATIONS:  acetaminophen   Tablet 650 milliGRAM(s) Oral every 6 hours PRN  acetaminophen   Tablet. 650 milliGRAM(s) Oral every 6 hours PRN  carBAMazepine XR Tablet 400 milliGRAM(s) Oral at bedtime  ceFAZolin   IVPB 2000 milliGRAM(s) IV Intermittent once  dexamethasone     Tablet 4 milliGRAM(s) Oral every 6 hours  dexamethasone     Tablet 4 milliGRAM(s) Oral every 8 hours  dexamethasone     Tablet   Oral   docusate sodium 100 milliGRAM(s) Oral three times a day  HYDROmorphone  Injectable 0.5 milliGRAM(s) IV Push every 10 minutes PRN  levETIRAcetam 500 milliGRAM(s) Oral every 12 hours  levothyroxine 50 MICROGram(s) Oral daily  multivitamin 1 Tablet(s) Oral daily  ondansetron Injectable 4 milliGRAM(s) IV Push every 6 hours PRN  oxyCODONE    5 mG/acetaminophen 325 mG 1 Tablet(s) Oral every 4 hours PRN  oxyCODONE    5 mG/acetaminophen 325 mG 2 Tablet(s) Oral every 6 hours PRN  pantoprazole    Tablet 40 milliGRAM(s) Oral daily  prochlorperazine   Injectable 5 milliGRAM(s) IntraMuscular every 4 hours PRN  sodium chloride 0.9% with potassium chloride 20 mEq/L 1000 milliLiter(s) IV Continuous <Continuous>    EXAMINATION:  General: Mild distress from nausea, but calm and cooperative  HEENT: Anicteric sclerae  Cardiac: [id number]  Lungs: Clear  Abdomen: Soft, non-tender, +BS  Extremities: No c/c/e  Skin/Incision Site: Clean, dry and intact  Neurologic: Awake, alert, fully oriented, follows commands, PERRL, EOMI, horizontal nystagmus most prominent on leftward gaze, face symmetric, tongue midline, no drift, full strength HPI:  SUMMARY:  41 year-old [place] woman with history of hypothyroidism 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 She underwent right petrosal craniotomy with fat graft and removal of epidermoid mass.    Post op Complaining of nausea and vomiting post-op. Improved with anti-emetics. No pain or weakness.    OVERNIGHT EVENTS:   No acute events overnight.    VITALS:  T(C): , Max: 37.1 (10-24-17 @ 17:30)  HR:  (84 - 107)  BP:  (85/60 - 114/68)  ABP:  (92/77 - 124/70)  RR:  (0 - 27)  SpO2:  (97% - 100%)  Wt(kg): --      LABS:  Na: 148 (10-24 @ 23:13)  K: 3.6 (10-24 @ 23:13)  Cl: 113 (10-24 @ 23:13)  CO2: 21 (10-24 @ 23:13)  BUN: 7 (10-24 @ 23:13)  Cr: 0.61 (10-24 @ 23:13)  Glu:     Hgb: 9.1 (10-24 @ 23:13)  Hct: 28.3 (10-24 @ 23:13)  WBC: 15.0 (10-24 @ 23:13)  Plt: 314 (10-24 @ 23:13)      IMAGING:   Recent imaging studies were reviewed.    MEDICATIONS:  acetaminophen   Tablet 650 milliGRAM(s) Oral every 6 hours PRN  acetaminophen   Tablet. 650 milliGRAM(s) Oral every 6 hours PRN  carBAMazepine XR Tablet 400 milliGRAM(s) Oral at bedtime  ceFAZolin   IVPB 2000 milliGRAM(s) IV Intermittent once  dexamethasone     Tablet 4 milliGRAM(s) Oral every 6 hours  dexamethasone     Tablet 4 milliGRAM(s) Oral every 8 hours  dexamethasone     Tablet   Oral   docusate sodium 100 milliGRAM(s) Oral three times a day  HYDROmorphone  Injectable 0.5 milliGRAM(s) IV Push every 10 minutes PRN  levETIRAcetam 500 milliGRAM(s) Oral every 12 hours  levothyroxine 50 MICROGram(s) Oral daily  multivitamin 1 Tablet(s) Oral daily  ondansetron Injectable 4 milliGRAM(s) IV Push every 6 hours PRN  oxyCODONE    5 mG/acetaminophen 325 mG 1 Tablet(s) Oral every 4 hours PRN  oxyCODONE    5 mG/acetaminophen 325 mG 2 Tablet(s) Oral every 6 hours PRN  pantoprazole    Tablet 40 milliGRAM(s) Oral daily  prochlorperazine   Injectable 5 milliGRAM(s) IntraMuscular every 4 hours PRN  sodium chloride 0.9% with potassium chloride 20 mEq/L 1000 milliLiter(s) IV Continuous <Continuous>    EXAMINATION:  General: calm and cooperative  HEENT: Anicteric sclerae  Cardiac: [id number]  Lungs: Clear  Abdomen: Soft, non-tender, +BS  Extremities: No c/c/e  Skin/Incision Site: Clean, dry and intact  Neurologic: Awake, alert, fully oriented, follows commands, PERRL, EOMI, horizontal nystagmus most prominent on leftward gaze, face symmetric, tongue midline, no drift, full strength

## 2017-10-25 NOTE — PHYSICAL THERAPY INITIAL EVALUATION ADULT - GENERAL OBSERVATIONS, REHAB EVAL
Received in bed, +MAN drains x 2, +IV, +cardiac monitor, +prado, reported 6/10 headache but was recently medicated and agreeable to PT, spouse and sisters at bedside

## 2017-10-25 NOTE — PROGRESS NOTE ADULT - ASSESSMENT
41F POD1 from R petrosal craniotomy for resection of an epidermoid.   -CTH in AM  - CSF leak watch  - MRI pending   - q. 1 hour neuro checks

## 2017-10-25 NOTE — PHYSICAL THERAPY INITIAL EVALUATION ADULT - ADDITIONAL COMMENTS
Pt lives in a 2nd floor apartment with 5 steps to enter, bilateral handrails; 17 steps inside, 1 handrail

## 2017-10-25 NOTE — PROGRESS NOTE ADULT - SUBJECTIVE AND OBJECTIVE BOX
Patient Seen and Examined.     Overnight Events: None    T(C): 37.1 (10-24-17 @ 23:00), Max: 37.1 (10-24-17 @ 17:30)  HR: 90 (10-25-17 @ 00:30) (83 - 107)  BP: 100/59 (10-25-17 @ 00:30) (85/60 - 114/68)  RR: 19 (10-25-17 @ 00:30) (0 - 27)  SpO2: 97% (10-25-17 @ 00:30) (97% - 100%)    Exam:   Awake, alert, AOX3  PERRL, b/l nystagmus,   BUE 5/5  BLE 5/5     acetaminophen   Tablet 650 milliGRAM(s) Oral every 6 hours PRN  acetaminophen   Tablet. 650 milliGRAM(s) Oral every 6 hours PRN  carBAMazepine XR Tablet 400 milliGRAM(s) Oral at bedtime  ceFAZolin   IVPB 2000 milliGRAM(s) IV Intermittent once  ceFAZolin   IVPB 2000 milliGRAM(s) IV Intermittent every 8 hours  dexamethasone     Tablet 4 milliGRAM(s) Oral every 6 hours  dexamethasone     Tablet 4 milliGRAM(s) Oral every 8 hours  dexamethasone     Tablet   Oral   docusate sodium 100 milliGRAM(s) Oral three times a day  HYDROmorphone  Injectable 0.5 milliGRAM(s) IV Push every 10 minutes PRN  levETIRAcetam 500 milliGRAM(s) Oral every 12 hours  levothyroxine 50 MICROGram(s) Oral daily  multivitamin 1 Tablet(s) Oral daily  ondansetron Injectable 4 milliGRAM(s) IV Push every 6 hours PRN  oxyCODONE    5 mG/acetaminophen 325 mG 1 Tablet(s) Oral every 4 hours PRN  oxyCODONE    5 mG/acetaminophen 325 mG 2 Tablet(s) Oral every 6 hours PRN  pantoprazole    Tablet 40 milliGRAM(s) Oral daily  prochlorperazine   Injectable 5 milliGRAM(s) IntraMuscular every 4 hours PRN  sodium chloride 0.9% Bolus 500 milliLiter(s) IV Bolus once  sodium chloride 0.9% with potassium chloride 20 mEq/L 1000 milliLiter(s) IV Continuous <Continuous>                            9.1    15.0  )-----------( 314      ( 24 Oct 2017 23:13 )             28.3     10-24    148<H>  |  113<H>  |  7   ----------------------------<  166<H>  3.6   |  21<L>  |  0.61    Ca    7.5<L>      24 Oct 2017 23:13  Phos  3.1     10-24  Mg     1.7     10-24          Imaging:

## 2017-10-26 LAB
HBA1C BLD-MCNC: 5.6 % — SIGNIFICANT CHANGE UP (ref 4–5.6)
OSMOLALITY UR: 537 MOS/KG — SIGNIFICANT CHANGE UP (ref 300–900)

## 2017-10-26 PROCEDURE — 70553 MRI BRAIN STEM W/O & W/DYE: CPT | Mod: 26

## 2017-10-26 PROCEDURE — 99291 CRITICAL CARE FIRST HOUR: CPT

## 2017-10-26 RX ORDER — PANTOPRAZOLE SODIUM 20 MG/1
40 TABLET, DELAYED RELEASE ORAL
Qty: 0 | Refills: 0 | Status: DISCONTINUED | OUTPATIENT
Start: 2017-10-26 | End: 2017-10-31

## 2017-10-26 RX ORDER — MAGNESIUM SULFATE 500 MG/ML
2 VIAL (ML) INJECTION ONCE
Qty: 0 | Refills: 0 | Status: COMPLETED | OUTPATIENT
Start: 2017-10-26 | End: 2017-10-26

## 2017-10-26 RX ORDER — DOXAZOSIN MESYLATE 4 MG
2 TABLET ORAL AT BEDTIME
Qty: 0 | Refills: 0 | Status: DISCONTINUED | OUTPATIENT
Start: 2017-10-26 | End: 2017-10-28

## 2017-10-26 RX ORDER — LEVETIRACETAM 250 MG/1
500 TABLET, FILM COATED ORAL
Qty: 0 | Refills: 0 | Status: DISCONTINUED | OUTPATIENT
Start: 2017-10-26 | End: 2017-10-31

## 2017-10-26 RX ADMIN — Medication 100 MILLIGRAM(S): at 15:09

## 2017-10-26 RX ADMIN — Medication 100 MILLIGRAM(S): at 21:05

## 2017-10-26 RX ADMIN — ONDANSETRON 4 MILLIGRAM(S): 8 TABLET, FILM COATED ORAL at 08:00

## 2017-10-26 RX ADMIN — ENOXAPARIN SODIUM 40 MILLIGRAM(S): 100 INJECTION SUBCUTANEOUS at 17:02

## 2017-10-26 RX ADMIN — Medication 4 MILLIGRAM(S): at 15:09

## 2017-10-26 RX ADMIN — OXYCODONE AND ACETAMINOPHEN 1 TABLET(S): 5; 325 TABLET ORAL at 10:40

## 2017-10-26 RX ADMIN — Medication 50 MICROGRAM(S): at 05:27

## 2017-10-26 RX ADMIN — OXYCODONE AND ACETAMINOPHEN 2 TABLET(S): 5; 325 TABLET ORAL at 15:00

## 2017-10-26 RX ADMIN — OXYCODONE AND ACETAMINOPHEN 1 TABLET(S): 5; 325 TABLET ORAL at 03:45

## 2017-10-26 RX ADMIN — PANTOPRAZOLE SODIUM 40 MILLIGRAM(S): 20 TABLET, DELAYED RELEASE ORAL at 10:11

## 2017-10-26 RX ADMIN — LEVETIRACETAM 500 MILLIGRAM(S): 250 TABLET, FILM COATED ORAL at 17:03

## 2017-10-26 RX ADMIN — Medication 400 MILLIGRAM(S): at 21:05

## 2017-10-26 RX ADMIN — LEVETIRACETAM 400 MILLIGRAM(S): 250 TABLET, FILM COATED ORAL at 05:27

## 2017-10-26 RX ADMIN — Medication 100 MILLIGRAM(S): at 05:27

## 2017-10-26 RX ADMIN — Medication 63.75 MILLIMOLE(S): at 02:21

## 2017-10-26 RX ADMIN — Medication 2 MILLIGRAM(S): at 21:05

## 2017-10-26 RX ADMIN — Medication 4 MILLIGRAM(S): at 21:05

## 2017-10-26 RX ADMIN — Medication 50 GRAM(S): at 02:21

## 2017-10-26 RX ADMIN — Medication 4 MILLIGRAM(S): at 05:27

## 2017-10-26 RX ADMIN — OXYCODONE AND ACETAMINOPHEN 1 TABLET(S): 5; 325 TABLET ORAL at 03:01

## 2017-10-26 RX ADMIN — OXYCODONE AND ACETAMINOPHEN 2 TABLET(S): 5; 325 TABLET ORAL at 15:30

## 2017-10-26 RX ADMIN — OXYCODONE AND ACETAMINOPHEN 1 TABLET(S): 5; 325 TABLET ORAL at 10:11

## 2017-10-26 NOTE — PROGRESS NOTE ADULT - ASSESSMENT
ASSESSMENT/PLAN: Epidermoid cyst excision, post-operative day 2 from resection    NEURO:  MRI post-op, Pain control, carbamazepine for trigeminal neuralgia, steroid taper for cerebral edema, keppra for sz ppx to end 11/1  Activity: [x] OOB as tolerated [x] Bedrest [x] PT [x] OT [] PMNR    PULM:  Incentive spirometry, mobilize as tolerated    CV:  -150mmHg    RENAL:  IVF until good PO intake    GI:  Diet: Advance diet as tolerated  Anti-emetics PRN  GI prophylaxis [] not indicated [] PPI [] other:  Bowel regimen [] colace [] senna [] other:    ENDO:   Goal euglycemia (-180), awaiting a1c  Hypothyroidism: continue home levothyroxine    HEME/ONC:  VTE prophylaxis: [x] SCDs [] chemoprophylaxis: start lovenox [x] high risk of DVT/PE on admission due to: tumor    ID:  afebrile    SOCIAL/FAMILY:  [] awaiting [x] updated at bedside [] family meeting    CODE STATUS:  [x] Full Code [] DNR [] DNI [] Palliative/Comfort Care    DISPOSITION:  [x] ICU [] Stroke Unit [] Floor [] EMU [] RCU [] PCU    [x] Patient is at high risk of neurologic deterioration/death due to: brain compression    Time spent: 35 [x] critical care minutes ASSESSMENT/PLAN: Epidermoid cyst excision, post-operative day 2 from resection    NEURO:  MRI post-op, Pain control, carbamazepine for trigeminal neuralgia, steroid taper for cerebral edema, keppra for sz ppx to end 11/1  SG drain managemnt as per NSx  HMV remove today  Activity: [x] OOB as tolerated [] Bedrest [x] PT [x] OT [] PMNR    PULM:  Incentive spirometry, mobilize as tolerated    CV:  -150mmHg    RENAL:  IVL  Quinn placed 10/26 Am for retention, started on Cardura - trial in 48 hours    GI:  Diet: Advance diet as tolerated  Anti-emetics PRN, nausea resolved  GI prophylaxis [x] not indicated [] PPI [] other:  Bowel regimen [] colace [x] senna [] other:    ENDO:   Goal euglycemia (-180), awaiting a1c  Hypothyroidism: continue home levothyroxine    HEME/ONC:  VTE prophylaxis: [x] SCDs [] chemoprophylaxis: lovenox [x] high risk of DVT/PE on admission due to: tumor    ID:  afebrile    SOCIAL/FAMILY:  [] awaiting [x] updated at bedside [] family meeting    CODE STATUS:  [x] Full Code [] DNR [] DNI [] Palliative/Comfort Care    DISPOSITION:  [x] ICU [] Stroke Unit [] Floor [] EMU [] RCU [] PCU    [x] Patient is at high risk of neurologic deterioration/death due to: brain compression    Time spent: 35 [x] critical care minutes ASSESSMENT/PLAN: Epidermoid cyst excision, post-operative day 2 from resection    NEURO:  MRI post-op, Pain control, carbamazepine for trigeminal neuralgia, steroid taper for cerebral edema, keppra for sz ppx to end 11/1  SG drain managemnt as per NSx - not holding suction, still draining, monitor output, call NSx if decreased output  HMV remove today  Activity: [x] OOB as tolerated [] Bedrest [x] PT [x] OT [] PMNR    PULM:  Incentive spirometry, mobilize as tolerated    CV:  -150mmHg    RENAL:  IVL  Quinn placed 10/26 Am for retention, started on Cardura - trial in 48 hours    GI:  Diet: Advance diet as tolerated  Anti-emetics PRN, nausea resolved  GI prophylaxis [x] not indicated [] PPI [] other:  Bowel regimen [] colace [x] senna [] other:    ENDO:   Goal euglycemia (-180), awaiting a1c  Hypothyroidism: continue home levothyroxine    HEME/ONC:  VTE prophylaxis: [x] SCDs [] chemoprophylaxis: lovenox [x] high risk of DVT/PE on admission due to: tumor    ID:  afebrile    SOCIAL/FAMILY:  [] awaiting [x] updated at bedside [] family meeting    CODE STATUS:  [x] Full Code [] DNR [] DNI [] Palliative/Comfort Care    DISPOSITION:  [x] ICU [] Stroke Unit [] Floor [] EMU [] RCU [] PCU    [x] Patient is at high risk of neurologic deterioration/death due to: brain compression    Time spent: 35 [x] critical care minutes

## 2017-10-26 NOTE — PROGRESS NOTE ADULT - SUBJECTIVE AND OBJECTIVE BOX
Doing well POD#2  Facial pain gone.    Awake, alert  CN exam intact  CALDERON well  Wounds C& D    postop CT yesterday - post op changes.  no sig heme/edema    Plan:  PT, post-op MRI, d/c planning

## 2017-10-26 NOTE — PROVIDER CONTACT NOTE (OTHER) - ASSESSMENT
patient neurologically stable, no changes. MAN drain not maintaining suction- continues to fill with air

## 2017-10-26 NOTE — PROGRESS NOTE ADULT - SUBJECTIVE AND OBJECTIVE BOX
MARIBELL [id number]      Drain type: SG MAN and Left Thigh MAN    Patient's position while drain removed: Supine    Patient tolerated well. No complications.    Exit Site secured with: 1 staple (cranial), Clean dressing (thigh)

## 2017-10-26 NOTE — OCCUPATIONAL THERAPY INITIAL EVALUATION ADULT - PERTINENT HX OF CURRENT PROBLEM, REHAB EVAL
42 yo [place] speaking female from [place], reports pain on right side of face for five years, has had wisdom teeth extractions and other dental work without relief. Pt. 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 Pt. presents to [facility] today for Right Petrosal Craniotomy, removal of epidermoid, harvest of fat graft on 10/24/17. Pt. continues to have pain on right side of face, sometimes is hard to talk,

## 2017-10-26 NOTE — OCCUPATIONAL THERAPY INITIAL EVALUATION ADULT - PLANNED THERAPY INTERVENTIONS, OT EVAL
balance training/bed mobility training/strengthening/transfer training/neuromuscular re-education/ADL retraining

## 2017-10-26 NOTE — PROGRESS NOTE ADULT - SUBJECTIVE AND OBJECTIVE BOX
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 She underwent right petrosal craniotomy with fat graft and removal of epidermoid mass.    Post op Complaining of nausea and vomiting post-op. Improved with anti-emetics. No pain or weakness.    OVERNIGHT EVENTS:   No acute events overnight.    VITALS:  T(C): , Max: 37.2 (10-25-17 @ 19:00)  HR:  (67 - 109)  BP:  (100/68 - 133/66)  ABP: --  RR:  (14 - 22)  SpO2:  (97% - 100%)  Wt(kg): --      LABS:  Na: 141 (10-25 @ 21:43), 148 (10-24 @ 23:13)  K: 4.5 (10-25 @ 21:43), 3.6 (10-24 @ 23:13)  Cl: 107 (10-25 @ 21:43), 113 (10-24 @ 23:13)  CO2: 23 (10-25 @ 21:43), 21 (10-24 @ 23:13)  BUN: 5 (10-25 @ 21:43), 7 (10-24 @ 23:13)  Cr: 0.51 (10-25 @ 21:43), 0.61 (10-24 @ 23:13)  Glu:     Hgb: 7.9 (10-25 @ 21:43), 9.1 (10-24 @ 23:13)  Hct: 23.2 (10-25 @ 21:43), 28.3 (10-24 @ 23:13)  WBC: 19.2 (10-25 @ 21:43), 15.0 (10-24 @ 23:13)  Plt: 264 (10-25 @ 21:43), 314 (10-24 @ 23:13)      IMAGING:   Recent imaging studies were reviewed.    MEDICATIONS:  acetaminophen   Tablet 650 milliGRAM(s) Oral every 6 hours PRN  acetaminophen   Tablet. 650 milliGRAM(s) Oral every 6 hours PRN  carBAMazepine XR Tablet 400 milliGRAM(s) Oral at bedtime  dexamethasone     Tablet 4 milliGRAM(s) Oral every 8 hours  dexamethasone     Tablet   Oral   docusate sodium 100 milliGRAM(s) Oral three times a day  doxazosin 2 milliGRAM(s) Oral at bedtime  enoxaparin Injectable 40 milliGRAM(s) SubCutaneous <User Schedule>  HYDROmorphone  Injectable 0.5 milliGRAM(s) IV Push every 10 minutes PRN  levETIRAcetam  IVPB 500 milliGRAM(s) IV Intermittent every 12 hours  levothyroxine 50 MICROGram(s) Oral daily  ondansetron Injectable 4 milliGRAM(s) IV Push every 6 hours PRN  oxyCODONE    5 mG/acetaminophen 325 mG 1 Tablet(s) Oral every 4 hours PRN  oxyCODONE    5 mG/acetaminophen 325 mG 2 Tablet(s) Oral every 6 hours PRN  pantoprazole  Injectable 40 milliGRAM(s) IV Push daily  prochlorperazine   Injectable 5 milliGRAM(s) IntraMuscular every 4 hours PRN  sodium chloride 0.9% with potassium chloride 20 mEq/L 1000 milliLiter(s) IV Continuous <Continuous>    EXAMINATION:  General: calm and cooperative  HEENT: Anicteric sclerae  Cardiac: [id number]  Lungs: Clear  Abdomen: Soft, non-tender, +BS  Extremities: No c/c/e  Skin/Incision Site: Clean, dry and intact  Neurologic: Awake, alert, fully oriented, follows commands, PERRL, EOMI, horizontal nystagmus most prominent on leftward gaze, face symmetric, tongue midline, no drift, full strength HPI:  41 year-old [place] woman with history of hypothyroidism 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 She underwent right petrosal craniotomy with fat graft and removal of epidermoid mass.    Post op Complaining of nausea and vomiting post-op. Improved with anti-emetics. No pain or weakness.    OVERNIGHT EVENTS:   No acute events overnight.    VITALS:  T(C): , Max: 37.2 (10-25-17 @ 19:00)  HR:  (67 - 109)  BP:  (100/68 - 133/66)  ABP: --  RR:  (14 - 22)  SpO2:  (97% - 100%)      LABS:  Na: 141 (10-25 @ 21:43), 148 (10-24 @ 23:13)  K: 4.5 (10-25 @ 21:43), 3.6 (10-24 @ 23:13)  Cl: 107 (10-25 @ 21:43), 113 (10-24 @ 23:13)  CO2: 23 (10-25 @ 21:43), 21 (10-24 @ 23:13)  BUN: 5 (10-25 @ 21:43), 7 (10-24 @ 23:13)  Cr: 0.51 (10-25 @ 21:43), 0.61 (10-24 @ 23:13)  Glu:     Hgb: 7.9 (10-25 @ 21:43), 9.1 (10-24 @ 23:13)  Hct: 23.2 (10-25 @ 21:43), 28.3 (10-24 @ 23:13)  WBC: 19.2 (10-25 @ 21:43), 15.0 (10-24 @ 23:13)  Plt: 264 (10-25 @ 21:43), 314 (10-24 @ 23:13)      IMAGING:   Recent imaging studies were reviewed.    MEDICATIONS:  acetaminophen   Tablet 650 milliGRAM(s) Oral every 6 hours PRN  acetaminophen   Tablet. 650 milliGRAM(s) Oral every 6 hours PRN  carBAMazepine XR Tablet 400 milliGRAM(s) Oral at bedtime  dexamethasone     Tablet 4 milliGRAM(s) Oral every 8 hours  dexamethasone     Tablet   Oral   docusate sodium 100 milliGRAM(s) Oral three times a day  doxazosin 2 milliGRAM(s) Oral at bedtime  enoxaparin Injectable 40 milliGRAM(s) SubCutaneous <User Schedule>  HYDROmorphone  Injectable 0.5 milliGRAM(s) IV Push every 10 minutes PRN  levETIRAcetam  IVPB 500 milliGRAM(s) IV Intermittent every 12 hours  levothyroxine 50 MICROGram(s) Oral daily  ondansetron Injectable 4 milliGRAM(s) IV Push every 6 hours PRN  oxyCODONE    5 mG/acetaminophen 325 mG 1 Tablet(s) Oral every 4 hours PRN  oxyCODONE    5 mG/acetaminophen 325 mG 2 Tablet(s) Oral every 6 hours PRN  pantoprazole  Injectable 40 milliGRAM(s) IV Push daily  prochlorperazine   Injectable 5 milliGRAM(s) IntraMuscular every 4 hours PRN  sodium chloride 0.9% with potassium chloride 20 mEq/L 1000 milliLiter(s) IV Continuous <Continuous>    EXAMINATION:  General: calm and cooperative  HEENT: Anicteric sclerae  Cardiac: [id number]  Lungs: Clear  Abdomen: Soft, non-tender, +BS  Extremities: No c/c/e  Skin/Incision Site: Clean, dry and intact  Neurologic: Awake, alert, fully oriented, follows commands, PERRL, EOMI, nystagmus improved, face symmetric, tongue midline, no drift, full strength

## 2017-10-26 NOTE — OCCUPATIONAL THERAPY INITIAL EVALUATION ADULT - ADDITIONAL COMMENTS
CT Head 10/25 Postoperative changes in the right posterior fossa after removal of a right CP angle epidermoid. Post right retrosigmoid craniectomy and fat graft.

## 2017-10-27 DIAGNOSIS — E03.9 HYPOTHYROIDISM, UNSPECIFIED: ICD-10-CM

## 2017-10-27 DIAGNOSIS — D64.89 OTHER SPECIFIED ANEMIAS: ICD-10-CM

## 2017-10-27 DIAGNOSIS — G50.0 TRIGEMINAL NEURALGIA: ICD-10-CM

## 2017-10-27 LAB
ANION GAP SERPL CALC-SCNC: 13 MMOL/L — SIGNIFICANT CHANGE UP (ref 5–17)
BUN SERPL-MCNC: 8 MG/DL — SIGNIFICANT CHANGE UP (ref 7–23)
CALCIUM SERPL-MCNC: 8.6 MG/DL — SIGNIFICANT CHANGE UP (ref 8.4–10.5)
CHLORIDE SERPL-SCNC: 102 MMOL/L — SIGNIFICANT CHANGE UP (ref 96–108)
CO2 SERPL-SCNC: 26 MMOL/L — SIGNIFICANT CHANGE UP (ref 22–31)
CREAT SERPL-MCNC: 0.47 MG/DL — LOW (ref 0.5–1.3)
GLUCOSE SERPL-MCNC: 102 MG/DL — HIGH (ref 70–99)
HCT VFR BLD CALC: 23.9 % — LOW (ref 34.5–45)
HGB BLD-MCNC: 7.8 G/DL — LOW (ref 11.5–15.5)
MCHC RBC-ENTMCNC: 26.5 PG — LOW (ref 27–34)
MCHC RBC-ENTMCNC: 32.6 GM/DL — SIGNIFICANT CHANGE UP (ref 32–36)
MCV RBC AUTO: 81.3 FL — SIGNIFICANT CHANGE UP (ref 80–100)
PLATELET # BLD AUTO: 284 K/UL — SIGNIFICANT CHANGE UP (ref 150–400)
POTASSIUM SERPL-MCNC: 4.1 MMOL/L — SIGNIFICANT CHANGE UP (ref 3.5–5.3)
POTASSIUM SERPL-SCNC: 4.1 MMOL/L — SIGNIFICANT CHANGE UP (ref 3.5–5.3)
RBC # BLD: 2.94 M/UL — LOW (ref 3.8–5.2)
RBC # FLD: 17.2 % — HIGH (ref 10.3–14.5)
SODIUM SERPL-SCNC: 141 MMOL/L — SIGNIFICANT CHANGE UP (ref 135–145)
WBC # BLD: 12.63 K/UL — HIGH (ref 3.8–10.5)
WBC # FLD AUTO: 12.63 K/UL — HIGH (ref 3.8–10.5)

## 2017-10-27 PROCEDURE — 99223 1ST HOSP IP/OBS HIGH 75: CPT

## 2017-10-27 PROCEDURE — 99221 1ST HOSP IP/OBS SF/LOW 40: CPT

## 2017-10-27 RX ADMIN — OXYCODONE AND ACETAMINOPHEN 1 TABLET(S): 5; 325 TABLET ORAL at 23:56

## 2017-10-27 RX ADMIN — LEVETIRACETAM 500 MILLIGRAM(S): 250 TABLET, FILM COATED ORAL at 06:16

## 2017-10-27 RX ADMIN — Medication 650 MILLIGRAM(S): at 13:15

## 2017-10-27 RX ADMIN — Medication 100 MILLIGRAM(S): at 13:14

## 2017-10-27 RX ADMIN — Medication 2 MILLIGRAM(S): at 21:28

## 2017-10-27 RX ADMIN — Medication 4 MILLIGRAM(S): at 13:14

## 2017-10-27 RX ADMIN — Medication 4 MILLIGRAM(S): at 06:16

## 2017-10-27 RX ADMIN — PANTOPRAZOLE SODIUM 40 MILLIGRAM(S): 20 TABLET, DELAYED RELEASE ORAL at 06:16

## 2017-10-27 RX ADMIN — Medication 50 MICROGRAM(S): at 06:16

## 2017-10-27 RX ADMIN — Medication 100 MILLIGRAM(S): at 21:28

## 2017-10-27 RX ADMIN — ENOXAPARIN SODIUM 40 MILLIGRAM(S): 100 INJECTION SUBCUTANEOUS at 17:11

## 2017-10-27 RX ADMIN — Medication 650 MILLIGRAM(S): at 14:00

## 2017-10-27 RX ADMIN — Medication 100 MILLIGRAM(S): at 06:16

## 2017-10-27 RX ADMIN — LEVETIRACETAM 500 MILLIGRAM(S): 250 TABLET, FILM COATED ORAL at 17:11

## 2017-10-27 RX ADMIN — Medication 400 MILLIGRAM(S): at 21:28

## 2017-10-27 NOTE — CONSULT NOTE ADULT - PROBLEM SELECTOR RECOMMENDATION 9
patient with Hb=7.8  -per her has history of anemia, unsure cause.  -last LMP 10/6, regular 4-5 pads/day  -check Iron, TIBC, Ferritin in AM as suspect iron deficiency anemia component complicated by acute blood loss anemia in setting of surgery  -if iron deficiency anemia, would order oral iron

## 2017-10-27 NOTE — CONSULT NOTE ADULT - SUBJECTIVE AND OBJECTIVE BOX
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 She underwent an uncomplicated right transtemporal craniotomy and removal of the posterior fossa epidermoid tumor with Dr. Thorpe on 10/24. Repeat CT 10/25 showed stable post-op changes and on MRI of the brain 10/26 there was no evidence of residual tumor. Remainder of hospital course has been uncomplicated.        REVIEW OF SYSTEMS  Constitutional - No fever, No fatigue  HEENT - No visual disturbances, No difficulty hearing,  No neck pain  Respiratory - No cough, No wheezing, No shortness of breath  Cardiovascular - No chest pain, No palpitations  Gastrointestinal - No abdominal pain, No nausea, No vomiting, No diarrhea, No constipation  Genitourinary - No dysuria, No frequency, No hematuria, No incontinence  Neurological - No headaches, No loss of strength, No numbness  Skin - No itching, No rashes  Endocrine - No temperature intolerance  Musculoskeletal - No joint pain, No joint swelling, No muscle pain  Psychiatric - No depression, No anxiety  All other review of systems negative    PAST MEDICAL & SURGICAL HISTORY  Iron deficiency anemia  Hypothyroid  Trigeminal neuralgia of right side of face  No significant past surgical history      SOCIAL HISTORY  Smoking - Denied  EtOH - Denied   Drugs - Denied    FUNCTIONAL HISTORY  _______ hand dominant  Lives with  and children in a 2nd floor apt with 5 [address] the building +2 HR and 17 steps to apt +1 HR  Independent in ambulation, ADL's, transfers prior to hospitalization  Works as a     CURRENT FUNCTIONAL STATUS  Bed mobility - min assist  Transfers - min assist  Gait - min assist  ADL's - contact guard UE dressing    FAMILY HISTORY   Reviewed and non-contributory    ALLERGIES  No Known Allergies    VITALS  T(C): 37 (27 Oct 2017 07:36), Max: 37.2 (26 Oct 2017 11:00)  T(F): 98.6 (27 Oct 2017 07:36), Max: 99 (26 Oct 2017 11:00)  HR: 89 (27 Oct 2017 07:36) (82 - 102)  BP: 102/68 (27 Oct 2017 07:36) (102/68 - 129/75)  RR: 18 (27 Oct 2017 07:36) (15 - 20)  SpO2: 98% (27 Oct 2017 07:36) (97% - 100%)      PHYSICAL EXAM  Constitutional - NAD, Comfortable  HEENT - NCAT, EOMI  Neck - Supple  Chest - CTA bilaterally  Cardiovascular - RRR, S1S2  Abdomen - BS+, Soft, NTND  Extremities - No C/C/E, No calf tenderness   Neurologic Exam -                    Cognitive - Awake, Alert, AAO to self, place, date, year, situation     Communication - Fluent, No dysarthria     Attention - Intact, able to recite days of week backwards     Memory - Intact, able to recall 3/3 items after 3 minutes     Cranial Nerves - CN 2-12 grossly intact     Motor -                     LEFT    UE - ShAB 5/5, EF 5/5, EE 5/5, WE 5/5,  5/5                    RIGHT UE - ShAB 5/5, EF 5/5, EE 5/5, WE 5/5,  5/5                    LEFT    LE - HF 5/5, KE 5/5, DF 5/5, PF 5/5                    RIGHT LE - HF 5/5, KE 5/5, DF 5/5, PF 5/5        Sensory - Intact to light touch     Reflexes - DTR intact and symmetrical, Negative Block's bilaterally, Negative Babinski's bilaterally      Coordination - Finger-to-nose intact bilaterally   Psychiatric - Affect WNL    RECENT LABS/IMAGING                        7.9    19.2  )-----------( 264      ( 25 Oct 2017 21:43 )             23.2     10-25    141  |  107  |  5<L>  ----------------------------<  136<H>  4.5   |  23  |  0.51    Ca    8.7      25 Oct 2017 21:43  Phos  1.7     10-25  Mg     1.8     10-25        MEDICATIONS   MEDICATIONS  (STANDING):  carBAMazepine XR Tablet 400 milliGRAM(s) Oral at bedtime  dexamethasone     Tablet 4 milliGRAM(s) Oral every 8 hours  dexamethasone     Tablet   Oral   docusate sodium 100 milliGRAM(s) Oral three times a day  doxazosin 2 milliGRAM(s) Oral at bedtime  enoxaparin Injectable 40 milliGRAM(s) SubCutaneous <User Schedule>  levETIRAcetam 500 milliGRAM(s) Oral two times a day  levothyroxine 50 MICROGram(s) Oral daily  pantoprazole    Tablet 40 milliGRAM(s) Oral before breakfast    MEDICATIONS  (PRN):  acetaminophen   Tablet. 650 milliGRAM(s) Oral every 6 hours PRN Mild Pain (1 - 3)  ondansetron Injectable 4 milliGRAM(s) IV Push every 6 hours PRN Nausea and/or Vomiting  oxyCODONE    5 mG/acetaminophen 325 mG 1 Tablet(s) Oral every 4 hours PRN Moderate Pain  oxyCODONE    5 mG/acetaminophen 325 mG 2 Tablet(s) Oral every 6 hours PRN Severe Pain  prochlorperazine   Injectable 5 milliGRAM(s) IntraMuscular every 4 hours PRN Nausea/Vomiting      ASSESSMENT/PLAN  42 y/o female s/p craniotomy and epidermoid tumor resection now with functional, gait, ADL impairments.    Disposition - Recommend acute rehab. 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 She underwent an uncomplicated right transtemporal craniotomy and removal of the posterior fossa epidermoid tumor with Dr. Thorpe on 10/24. Repeat CT 10/25 showed stable post-op changes and on MRI of the brain 10/26 there was no evidence of residual tumor. Remainder of hospital course has been uncomplicated. [employer]  Pool used during exam, [place] speaking.       REVIEW OF SYSTEMS  Constitutional - No fever, No fatigue  HEENT - No visual disturbances, Decreased hearing in right ear,  No neck pain  Respiratory - No cough, No wheezing, No shortness of breath, Dry throat  Cardiovascular - No chest pain, No palpitations  Gastrointestinal - No abdominal pain, No nausea, No vomiting, No diarrhea, No constipation  Genitourinary - Quinn catheter in place  Neurological - Incisional HA controlled with pain meds, +Dizziness, No loss of strength, No numbness  Skin - No itching, No rashes  Endocrine - No temperature intolerance  Musculoskeletal - No joint pain, No joint swelling, No muscle pain  Psychiatric - No depression, No anxiety  All other review of systems negative    PAST MEDICAL & SURGICAL HISTORY  Iron deficiency anemia  Hypothyroid  Trigeminal neuralgia of right side of face  No significant past surgical history      SOCIAL HISTORY  Smoking - Denied  EtOH - Denied   Drugs - Denied    FUNCTIONAL HISTORY  Right hand dominant  Lives with  and children in a 2nd floor apt with 5 [address] the building +2 HR and 17 steps to apt +1 HR  Independent in ambulation, ADL's, transfers prior to hospitalization  Works as a , does not drive    CURRENT FUNCTIONAL STATUS  Bed mobility - min assist  Transfers - min assist  Gait - min assist  ADL's - contact guard UE dressing    FAMILY HISTORY   Reviewed and non-contributory    ALLERGIES  No Known Allergies    VITALS  T(C): 37 (27 Oct 2017 07:36), Max: 37.2 (26 Oct 2017 11:00)  T(F): 98.6 (27 Oct 2017 07:36), Max: 99 (26 Oct 2017 11:00)  HR: 89 (27 Oct 2017 07:36) (82 - 102)  BP: 102/68 (27 Oct 2017 07:36) (102/68 - 129/75)  RR: 18 (27 Oct 2017 07:36) (15 - 20)  SpO2: 98% (27 Oct 2017 07:36) (97% - 100%)      PHYSICAL EXAM  Constitutional - NAD, Comfortable  HEENT - s/p right craniotomy with sutures and staples intact, EOMI  Neck - Supple  Chest - CTA bilaterally  Cardiovascular - RRR, S1S2  Abdomen - BS+, Soft, NTND  Extremities - No C/C/E, No calf tenderness   Neurologic Exam -                    Cognitive - Awake, Alert, AAO to self, place, date, year, situation     Communication - Fluent, No dysarthria     Attention - Intact, able to recite days of week backwards, follows multi-step commands     Cranial Nerves - CN 5 impairment unable to raise eyebrow on right side, decreased hearing in right ear compared to left     Motor -                     LEFT    UE - ShAB 5/5, EF 5/5, EE 5/5, WE 5/5,  5/5                    RIGHT UE - ShAB 5/5, EF 5/5, EE 5/5, WE 5/5,  5/5                    LEFT    LE - HF 5/5, KE 5/5, DF 5/5, PF 5/5                    RIGHT LE - HF 5/5, KE 5/5, DF 5/5, PF 5/5        Sensory - Intact to light touch     Reflexes - DTR intact and symmetrical, Negative Block's bilaterally, Negative Babinski's bilaterally      Coordination - Finger-to-nose intact bilaterally   Psychiatric - Affect WNL    RECENT LABS/IMAGING                        7.9    19.2  )-----------( 264      ( 25 Oct 2017 21:43 )             23.2     10-25    141  |  107  |  5<L>  ----------------------------<  136<H>  4.5   |  23  |  0.51    Ca    8.7      25 Oct 2017 21:43  Phos  1.7     10-25  Mg     1.8     10-25        MEDICATIONS   MEDICATIONS  (STANDING):  carBAMazepine XR Tablet 400 milliGRAM(s) Oral at bedtime  dexamethasone     Tablet 4 milliGRAM(s) Oral every 8 hours  dexamethasone     Tablet   Oral   docusate sodium 100 milliGRAM(s) Oral three times a day  doxazosin 2 milliGRAM(s) Oral at bedtime  enoxaparin Injectable 40 milliGRAM(s) SubCutaneous <User Schedule>  levETIRAcetam 500 milliGRAM(s) Oral two times a day  levothyroxine 50 MICROGram(s) Oral daily  pantoprazole    Tablet 40 milliGRAM(s) Oral before breakfast    MEDICATIONS  (PRN):  acetaminophen   Tablet. 650 milliGRAM(s) Oral every 6 hours PRN Mild Pain (1 - 3)  ondansetron Injectable 4 milliGRAM(s) IV Push every 6 hours PRN Nausea and/or Vomiting  oxyCODONE    5 mG/acetaminophen 325 mG 1 Tablet(s) Oral every 4 hours PRN Moderate Pain  oxyCODONE    5 mG/acetaminophen 325 mG 2 Tablet(s) Oral every 6 hours PRN Severe Pain  prochlorperazine   Injectable 5 milliGRAM(s) IntraMuscular every 4 hours PRN Nausea/Vomiting      ASSESSMENT/PLAN  40 y/o female s/p craniotomy and epidermoid tumor resection now with functional, gait, balance, ADL impairments.    Disposition - Recommend acute rehab. 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 She underwent an uncomplicated right transtemporal craniotomy and removal of the posterior fossa epidermoid tumor with Dr. Thorpe on 10/24. Repeat CT 10/25 showed stable post-op changes and on MRI of the brain 10/26 there was no evidence of residual tumor. Remainder of hospital course has been uncomplicated. [employer]  Pool used during exam, [place] speaking.       REVIEW OF SYSTEMS  Constitutional - No fever, No fatigue  HEENT - No visual disturbances, Decreased hearing in right ear,  No neck pain  Respiratory - No cough, No wheezing, No shortness of breath, Dry throat  Cardiovascular - No chest pain, No palpitations  Gastrointestinal - No abdominal pain, No nausea, No vomiting, No diarrhea, No constipation  Genitourinary - Quinn catheter in place  Neurological - Incisional HA controlled with pain meds, +Dizziness, No loss of strength, No numbness  Skin - No itching, No rashes  Endocrine - No temperature intolerance  Musculoskeletal - No joint pain, No joint swelling, No muscle pain  Psychiatric - No depression, No anxiety  All other review of systems negative    PAST MEDICAL & SURGICAL HISTORY  Iron deficiency anemia  Hypothyroid  Trigeminal neuralgia of right side of face  No significant past surgical history      SOCIAL HISTORY  Smoking - Denied  EtOH - Denied   Drugs - Denied    FUNCTIONAL HISTORY  Right hand dominant  Lives with  and children in a 2nd floor apt with 5 [address] the building +2 HR and 17 steps to apt +1 HR  Independent in ambulation, ADL's, transfers prior to hospitalization  Works as a , does not drive    CURRENT FUNCTIONAL STATUS  Bed mobility - min assist  Transfers - min assist  Gait - min assist  ADL's - contact guard UE dressing    FAMILY HISTORY   Reviewed and non-contributory    ALLERGIES  No Known Allergies    VITALS  T(C): 37 (27 Oct 2017 07:36), Max: 37.2 (26 Oct 2017 11:00)  T(F): 98.6 (27 Oct 2017 07:36), Max: 99 (26 Oct 2017 11:00)  HR: 89 (27 Oct 2017 07:36) (82 - 102)  BP: 102/68 (27 Oct 2017 07:36) (102/68 - 129/75)  RR: 18 (27 Oct 2017 07:36) (15 - 20)  SpO2: 98% (27 Oct 2017 07:36) (97% - 100%)      PHYSICAL EXAM  Constitutional - NAD, Comfortable  HEENT - s/p right craniotomy with sutures and staples intact, EOMI  Neck - Supple  Chest - CTA bilaterally  Cardiovascular - RRR, S1S2  Abdomen - BS+, Soft, NTND  Extremities - No C/C/E, No calf tenderness   Neurologic Exam -                    Cognitive - Awake, Alert, AAO to self, place, date, year, situation     Communication - Fluent, No dysarthria     Attention - Intact, able to recite days of week backwards, follows multi-step commands     Cranial Nerves - CN 5 impairment unable to raise eyebrow on right side, decreased hearing in right ear compared to left     Motor -                     LEFT    UE - ShAB 5/5, EF 5/5, EE 5/5, WE 5/5,  5/5                    RIGHT UE - ShAB 5/5, EF 5/5, EE 5/5, WE 5/5,  5/5                    LEFT    LE - HF 5/5, KE 5/5, DF 5/5, PF 5/5                    RIGHT LE - HF 5/5, KE 5/5, DF 5/5, PF 5/5        Sensory - Intact to light touch     Reflexes - DTR intact and symmetrical, Negative Block's bilaterally, Negative Babinski's bilaterally      Coordination - Finger-to-nose intact bilaterally , no nystagmus  Psychiatric - Affect WNL    RECENT LABS/IMAGING                        7.9    19.2  )-----------( 264      ( 25 Oct 2017 21:43 )             23.2     10-25    141  |  107  |  5<L>  ----------------------------<  136<H>  4.5   |  23  |  0.51    Ca    8.7      25 Oct 2017 21:43  Phos  1.7     10-25  Mg     1.8     10-25        MEDICATIONS   MEDICATIONS  (STANDING):  carBAMazepine XR Tablet 400 milliGRAM(s) Oral at bedtime  dexamethasone     Tablet 4 milliGRAM(s) Oral every 8 hours  dexamethasone     Tablet   Oral   docusate sodium 100 milliGRAM(s) Oral three times a day  doxazosin 2 milliGRAM(s) Oral at bedtime  enoxaparin Injectable 40 milliGRAM(s) SubCutaneous <User Schedule>  levETIRAcetam 500 milliGRAM(s) Oral two times a day  levothyroxine 50 MICROGram(s) Oral daily  pantoprazole    Tablet 40 milliGRAM(s) Oral before breakfast    MEDICATIONS  (PRN):  acetaminophen   Tablet. 650 milliGRAM(s) Oral every 6 hours PRN Mild Pain (1 - 3)  ondansetron Injectable 4 milliGRAM(s) IV Push every 6 hours PRN Nausea and/or Vomiting  oxyCODONE    5 mG/acetaminophen 325 mG 1 Tablet(s) Oral every 4 hours PRN Moderate Pain  oxyCODONE    5 mG/acetaminophen 325 mG 2 Tablet(s) Oral every 6 hours PRN Severe Pain  prochlorperazine   Injectable 5 milliGRAM(s) IntraMuscular every 4 hours PRN Nausea/Vomiting      ASSESSMENT/PLAN  40 y/o female s/p craniotomy and epidermoid tumor resection now with functional, gait, balance, ADL impairments.    Disposition - Recommend acute rehab. Can tolerate 3hrs PT, OT, SLP  PT - ROM, Bed mobility, Transfers, Ambulation with assistive device  OT - ADLs, ROM  SLP - Full cognitive eval at rehab  Precautions - Falls, Seizure  DVT Prophylaxis - Lovenox  Weight bearing status - full  Skin - Turn Q2hrs  Diet - Regular

## 2017-10-27 NOTE — CONSULT NOTE ADULT - SUBJECTIVE AND OBJECTIVE BOX
HPI:  40 yo Latvian speaking female from [place], reports pain on right side of face for five years, has had wisdom teeth extractions and other dental work without relief. Pt. 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 Pt. presents to [facility] today for Right Petrosal Craniotomy, removal of epidermoid, harvest of fat graft on 10/24/17. Pt. continues to have pain on right side of face, sometimes is hard to talk, denies recent fever, chills, chest pain, SOB, changes in bowel/urinary habits.  Pt. had a physical at PCP's office, lab work from 10/11/17 revealed a TSH level of 6.17 pt. started levothyroxine 50 mcg yesterday 10/16/17. Spoke with VERONICA Bell to Dr. Thorpe, he is aware of TSH levels and that patient just started medication. Case d/w Dr. 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 No nausea, vomiting, or hematemesis; No diarrhea or constipation. No melena or hematochezia.  GENITOURINARY: No dysuria, frequency, hematuria, or incontinence  NEUROLOGICAL: No headaches, memory loss, loss of strength, numbness, or tremors  SKIN: No itching, burning, rashes, or lesions   LYMPH NODES: No enlarged glands  ENDOCRINE: No heat or cold intolerance; No hair loss  MUSCULOSKELETAL: No joint pain or swelling; No muscle, back, or extremity pain  PSYCHIATRIC: No depression, anxiety, mood swings, or difficulty sleeping  HEME/LYMPH: No easy bruising, or bleeding gums  ALLERY AND IMMUNOLOGIC: No hives or eczema    Allergies    No Known Allergies    Intolerances        Social History:     FAMILY HISTORY:      MEDICATIONS  (STANDING):  carBAMazepine XR Tablet 400 milliGRAM(s) Oral at bedtime  dexamethasone     Tablet   Oral   docusate sodium 100 milliGRAM(s) Oral three times a day  doxazosin 2 milliGRAM(s) Oral at bedtime  enoxaparin Injectable 40 milliGRAM(s) SubCutaneous <User Schedule>  levETIRAcetam 500 milliGRAM(s) Oral two times a day  levothyroxine 50 MICROGram(s) Oral daily  pantoprazole    Tablet 40 milliGRAM(s) Oral before breakfast    MEDICATIONS  (PRN):  acetaminophen   Tablet. 650 milliGRAM(s) Oral every 6 hours PRN Mild Pain (1 - 3)  ondansetron Injectable 4 milliGRAM(s) IV Push every 6 hours PRN Nausea and/or Vomiting  oxyCODONE    5 mG/acetaminophen 325 mG 1 Tablet(s) Oral every 4 hours PRN Moderate Pain  oxyCODONE    5 mG/acetaminophen 325 mG 2 Tablet(s) Oral every 6 hours PRN Severe Pain  prochlorperazine   Injectable 5 milliGRAM(s) IntraMuscular every 4 hours PRN Nausea/Vomiting        CAPILLARY BLOOD GLUCOSE        I&O's Summary    26 Oct 2017 07:01  -  27 Oct 2017 07:00  --------------------------------------------------------  IN: 1520 mL / OUT: 2745 mL / NET: -1225 mL        PHYSICAL EXAM:  GENERAL: NAD, well-developed  HEAD:  Atraumatic, Normocephalic  EYES: EOMI, PERRLA, conjunctiva and sclera clear  NECK: Supple, No JVD  CHEST/LUNG: Clear to auscultation bilaterally; No wheeze  HEART: Regular rate and rhythm; No murmurs, rubs, or gallops  ABDOMEN: Soft, Nontender, Nondistended; Bowel sounds present  EXTREMITIES:  2+ Peripheral Pulses, No clubbing, cyanosis, or edema  PSYCH: AAOx3  NEUROLOGY: non-focal  SKIN: No rashes or lesions  Vital Signs Last 24 Hrs  T(C): 36.7 (27 Oct 2017 14:00), Max: 37.2 (26 Oct 2017 21:58)  T(F): 98.1 (27 Oct 2017 14:00), Max: 99 (26 Oct 2017 21:58)  HR: 87 (27 Oct 2017 14:00) (82 - 90)  BP: 111/77 (27 Oct 2017 14:00) (102/68 - 129/75)  BP(mean): 91 (26 Oct 2017 19:00) (91 - 91)  RR: 18 (27 Oct 2017 14:00) (18 - 19)  SpO2: 99% (27 Oct 2017 14:00) (97% - 99%)  LABS:                        7.8    12.63 )-----------( 284      ( 27 Oct 2017 07:26 )             23.9     10-27    141  |  102  |  8   ----------------------------<  102<H>  4.1   |  26  |  0.47<L>    Ca    8.6      27 Oct 2017 07:32  Phos  1.7     10-25  Mg     1.8     10-25                RADIOLOGY & ADDITIONAL TESTS:    Imaging Personally Reviewed:    Consultant(s) Notes Reviewed:      Care Discussed with Consultants/Other Providers: HPI:  42 yo Setswana speaking female from [place], reports pain on right side of face for five years, has had wisdom teeth extractions and other dental work without relief. Information obtained from patient through  phone service. Pt. 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   Patient had epidermoid tumor monitored, but continued to have right facial pain depspite therapy and deemed suitable for surgery for removal of epidermoid cyst as thought to be  of symptoms. PAtient admitted to hospital for crani and epidermoid tumor resection on 10/24.  Patient had uncomplicated NSCU course and transferred to floors.  PAtient currently only complaints of intermittent dizziness occurring when she opens her eyes and resolved after a few seconds.  States does not feel like the room is spining, not positional in nature.  Denies fever, chills, blurry visoin, chest pain, sOB, abodminal pain, n/v/d.  States last bM 4 days ago.  States facial pain has disappeared.     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 Lives with  and 2 children    FAMILY HISTORY: No pertiennt first degree family histoyr   PMD: Dr. Junior [phone]      MEDICATIONS  (STANDING):  carBAMazepine XR Tablet 400 milliGRAM(s) Oral at bedtime  dexamethasone     Tablet   Oral   docusate sodium 100 milliGRAM(s) Oral three times a day  doxazosin 2 milliGRAM(s) Oral at bedtime  enoxaparin Injectable 40 milliGRAM(s) SubCutaneous <User Schedule>  levETIRAcetam 500 milliGRAM(s) Oral two times a day  levothyroxine 50 MICROGram(s) Oral daily  pantoprazole    Tablet 40 milliGRAM(s) Oral before breakfast    MEDICATIONS  (PRN):  acetaminophen   Tablet. 650 milliGRAM(s) Oral every 6 hours PRN Mild Pain (1 - 3)  ondansetron Injectable 4 milliGRAM(s) IV Push every 6 hours PRN Nausea and/or Vomiting  oxyCODONE    5 mG/acetaminophen 325 mG 1 Tablet(s) Oral every 4 hours PRN Moderate Pain  oxyCODONE    5 mG/acetaminophen 325 mG 2 Tablet(s) Oral every 6 hours PRN Severe Pain  prochlorperazine   Injectable 5 milliGRAM(s) IntraMuscular every 4 hours PRN Nausea/Vomiting        CAPILLARY BLOOD GLUCOSE        I&O's Summary    26 Oct 2017 07:01  -  27 Oct 2017 07:00  --------------------------------------------------------  IN: 1520 mL / OUT: 2745 mL / NET: -1225 mL        PHYSICAL EXAM:  GENERAL: NAD, well-developed  HEAD:  Atraumatic, Normocephalic  EYES: EOMI, PERRLA, conjunctiva and sclera clear.  No nystagmus on exam.  NECK: Supple, No JVD  CHEST/LUNG: Clear to auscultation bilaterally; No wheeze  HEART: Regular rate and rhythm; No murmurs, rubs, or gallops  ABDOMEN: Soft, Nontender, Nondistended; Bowel sounds present  EXTREMITIES:  2+ Peripheral Pulses, No clubbing, cyanosis, or edema  PSYCH: AAOx3  NEUROLOGY: non-focal    Vital Signs Last 24 Hrs  T(C): 36.7 (27 Oct 2017 14:00), Max: 37.2 (26 Oct 2017 21:58)  T(F): 98.1 (27 Oct 2017 14:00), Max: 99 (26 Oct 2017 21:58)  HR: 87 (27 Oct 2017 14:00) (82 - 90)  BP: 111/77 (27 Oct 2017 14:00) (102/68 - 129/75)  BP(mean): 91 (26 Oct 2017 19:00) (91 - 91)  RR: 18 (27 Oct 2017 14:00) (18 - 19)  SpO2: 99% (27 Oct 2017 14:00) (97% - 99%)  LABS:                        7.8    12.63 )-----------( 284      ( 27 Oct 2017 07:26 )             23.9     10-27    141  |  102  |  8   ----------------------------<  102<H>  4.1   |  26  |  0.47<L>    Ca    8.6      27 Oct 2017 07:32  Phos  1.7     10-25  Mg     1.8     10-25                RADIOLOGY & ADDITIONAL TESTS:    Imaging Personally Reviewed:  IMPRESSION: Redemonstration of right mastoidectomy, and right temporal   occipital craniotomy. Fat packing in the region of the mastoidectomy and   craniotomy is again appreciated. Thin extra-axial hemorrhage in the right   anterolateral temporal region and subjacent to the   mastoidectomy/craniotomy is appreciated.    No evidence for residual epidermoid tumor. There is no abnormal   parenchymal or leptomeningeal enhancement.    Vasogenic edema in the right temporal lobe. Partial effacement of the   right aspect of the suprasellar cistern.    Residual widening of the right cerebellopontine angle cistern.    No hydrocephalus or supratentorial midline shift.      Consultant(s) Notes Reviewed:      Care Discussed with Consultants/Other Providers:

## 2017-10-27 NOTE — PROGRESS NOTE ADULT - ASSESSMENT
HPI: 42 yo German speaking female from [place], reports pain on right side of face for five years, has had wisdom teeth extractions and other dental work without relief. Pt. 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 Pt. presents to [facility] today for Right Petrosal Craniotomy, removal of epidermoid, harvest of fat graft on 10/24/17. Pt. continues to have pain on right side of face, sometimes is hard to talk, denies recent fever, chills, chest pain, SOB, changes in bowel/urinary habits.  Pt. had a physical at PCP's office, lab work from 10/11/17 revealed a TSH level of 6.17 pt. started levothyroxine 50 mcg yesterday 10/16/17. Spoke with VERONICA Bell to Dr. Thorpe, he is aware of TSH levels and that patient just started medication. Case d/w Dr. Gonzalez (17 Oct 2017 08:46)    PROCEDURE: 10/24 Craniotomy by transtemporal approach with surgical removal of posterior fossa lesion; POD#3    PLAN:  -Continue decadron taper for cerebral edema  -Continue Keppra for seizure prophylaxis  -Continue Tegretol for hx of trigeminal neuralgia  -Will add meclizine as needed for dizziness  -Pain control with percocet  -Continue Protonix for GI ppx while on steroids  -Post op anemia; may be iron deficiency. Check iron studies.   -Leukocytosis improved; likely secondary to steroids, f/u WBC in am  -Encouraged mobilization  -Incentive spirometer   -DVT ppx: SQL, venodynes  -Dispo: PT/OT/PMR-acute rehab  -Will discuss with Dr. thorpe    [facility] #[id number] HPI: 42 yo Romanian speaking female from [place], reports pain on right side of face for five years, has had wisdom teeth extractions and other dental work without relief. Pt. 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 [facility]. [facility] today for Right Petrosal Craniotomy, removal of epidermoid, harvest of fat graft on 10/24/17. Pt. continues to have pain on right side of face, sometimes is hard to talk, denies recent fever, chills, chest pain, SOB, changes in bowel/urinary habits.  Pt. had a physical at PCP's office, lab work from 10/11/17 revealed a TSH level of 6.17 pt. started levothyroxine 50 mcg yesterday 10/16/17. Spoke with VERONICA Bell to Dr. Thorpe, he is aware of TSH levels and that patient just started medication. Case d/w Dr. Gonzalez (17 Oct 2017 08:46)    PROCEDURE: 10/24 Craniotomy by transtemporal approach with surgical removal of posterior fossa lesion; POD#3    PLAN:  -Continue decadron taper for cerebral edema  -Continue Keppra for seizure prophylaxis  -Continue Tegretol for hx of trigeminal neuralgia  -Will add meclizine as needed for dizziness  -Pain control with percocet  -Continue Protonix for GI ppx while on steroids  -continue colace, senna for bowel regimen. Will add Miralax.  -Post op anemia; may be iron deficiency. Check iron studies.   -Leukocytosis improved; likely secondary to steroids, f/u WBC in am  -Encouraged mobilization  -Incentive spirometer   -DVT ppx: SQL, venodynes  -Dispo: PT/OT/PMR-acute rehab  -Will discuss with Dr. thorpe    spectralink #[id number]

## 2017-10-27 NOTE — PROGRESS NOTE ADULT - SUBJECTIVE AND OBJECTIVE BOX
Pt cont' doing well.  some dizziness when ambulating.  No facial pain  Non focal exam  incisions C & D  postop MRI shows resection of epidermoid    Plan; Await rehab transfer

## 2017-10-27 NOTE — PROGRESS NOTE ADULT - SUBJECTIVE AND OBJECTIVE BOX
SUBJECTIVE: Patient seen and examined with  at bedside. She complains of mild pain and occasional dizziness when getting up. Otherwise no new complaints.     Vital Signs Last 24 Hrs  T(C): 36.7 (10-27-17 @ 16:00), Max: 37.2 (10-26-17 @ 21:58)  T(F): 98.1 (10-27-17 @ 16:00), Max: 99 (10-26-17 @ 21:58)  HR: 90 (10-27-17 @ 16:00) (82 - 90)  BP: 109/70 (10-27-17 @ 16:00) (102/68 - 129/75)  BP(mean): 91 (10-26-17 @ 19:00) (91 - 91)  RR: 18 (10-27-17 @ 16:00) (18 - 19)  SpO2: 99% (10-27-17 @ 16:00) (97% - 99%)    PHYSICAL EXAM:    Constitutional: No Acute Distress, resting comfortably in bed    Neurological: Awake, alert, oriented to person, place and time in Estonian, following commands in Estonian, no drift, face equal, tongue midline, moving all extremities with 5/5 strength, sensation intact to light touch throughout, pupils 4mm and reactive bilaterally, extraocular movements intact, mild bilateral horizontal nystagmus Ox3    Incision: +staples C/D/I; +abd steris C/D/I    Pulmonary: Clear to Auscultation, No rales, No rhonchi, No wheezes     Cardiovascular: S1, S2, Regular rate and rhythm     Gastrointestinal: Soft, Non-tender, Non-distended     Extremities: No calf tenderness bilaterally        LABS:                          7.8    12.63 )-----------( 284      ( 27 Oct 2017 07:26 )             23.9    10-27    141  |  102  |  8   ----------------------------<  102<H>  4.1   |  26  |  0.47<L>    Ca    8.6      27 Oct 2017 07:32  Phos  1.7     10-25  Mg     1.8     10-25        10-26 @ 07:01  -  10-27 @ 07:00  --------------------------------------------------------  IN: 1520 mL / OUT: 2745 mL / NET: -1225 mL        IMAGING:     10/27 MRI brain w/o contrast: Redemonstration of right mastoidectomy, and right temporal  occipital craniotomy. Fat packing in the region of the mastoidectomy and  craniotomy is again appreciated. Thin extra-axial hemorrhage in the right  anterolateral temporal region and subjacent to the mastoidectomy/craniotomy  is appreciated.    No evidence for residual epidermoid tumor. There is no abnormal parenchymal  or leptomeningeal enhancement.    Vasogenic edema in the right temporal lobe. Partial effacement of the right  aspect of the suprasellar cistern.    Residual widening of the right cerebellopontine angle cistern.    No hydrocephalus or supratentorial midline shift.        MEDICATIONS:  Antibiotics:    Neuro:  acetaminophen   Tablet. 650 milliGRAM(s) Oral every 6 hours PRN Mild Pain (1 - 3)  carBAMazepine XR Tablet 400 milliGRAM(s) Oral at bedtime  levETIRAcetam 500 milliGRAM(s) Oral two times a day  ondansetron Injectable 4 milliGRAM(s) IV Push every 6 hours PRN Nausea and/or Vomiting  oxyCODONE    5 mG/acetaminophen 325 mG 1 Tablet(s) Oral every 4 hours PRN Moderate Pain  oxyCODONE    5 mG/acetaminophen 325 mG 2 Tablet(s) Oral every 6 hours PRN Severe Pain  prochlorperazine   Injectable 5 milliGRAM(s) IntraMuscular every 4 hours PRN Nausea/Vomiting    Cardiac:  doxazosin 2 milliGRAM(s) Oral at bedtime    Pulm:    GI/:  docusate sodium 100 milliGRAM(s) Oral three times a day  pantoprazole    Tablet 40 milliGRAM(s) Oral before breakfast    Other:   dexamethasone     Tablet   Oral   enoxaparin Injectable 40 milliGRAM(s) SubCutaneous <User Schedule>  levothyroxine 50 MICROGram(s) Oral daily        DIET: regular

## 2017-10-28 DIAGNOSIS — I95.1 ORTHOSTATIC HYPOTENSION: ICD-10-CM

## 2017-10-28 LAB
ALBUMIN SERPL ELPH-MCNC: 3.7 G/DL — SIGNIFICANT CHANGE UP (ref 3.3–5)
ALP SERPL-CCNC: 65 U/L — SIGNIFICANT CHANGE UP (ref 40–120)
ALT FLD-CCNC: 18 U/L RC — SIGNIFICANT CHANGE UP (ref 10–45)
ANION GAP SERPL CALC-SCNC: 14 MMOL/L — SIGNIFICANT CHANGE UP (ref 5–17)
APTT BLD: 27.7 SEC — SIGNIFICANT CHANGE UP (ref 27.5–37.4)
AST SERPL-CCNC: 17 U/L — SIGNIFICANT CHANGE UP (ref 10–40)
BASOPHILS # BLD AUTO: 0.1 K/UL — SIGNIFICANT CHANGE UP (ref 0–0.2)
BASOPHILS NFR BLD AUTO: 0.6 % — SIGNIFICANT CHANGE UP (ref 0–2)
BILIRUB SERPL-MCNC: 0.2 MG/DL — SIGNIFICANT CHANGE UP (ref 0.2–1.2)
BUN SERPL-MCNC: 12 MG/DL — SIGNIFICANT CHANGE UP (ref 7–23)
CALCIUM SERPL-MCNC: 9 MG/DL — SIGNIFICANT CHANGE UP (ref 8.4–10.5)
CHLORIDE SERPL-SCNC: 100 MMOL/L — SIGNIFICANT CHANGE UP (ref 96–108)
CK MB CFR SERPL CALC: 1 NG/ML — SIGNIFICANT CHANGE UP (ref 0–3.8)
CK SERPL-CCNC: 50 U/L — SIGNIFICANT CHANGE UP (ref 25–170)
CO2 SERPL-SCNC: 25 MMOL/L — SIGNIFICANT CHANGE UP (ref 22–31)
CREAT SERPL-MCNC: 0.54 MG/DL — SIGNIFICANT CHANGE UP (ref 0.5–1.3)
EOSINOPHIL # BLD AUTO: 0.1 K/UL — SIGNIFICANT CHANGE UP (ref 0–0.5)
EOSINOPHIL NFR BLD AUTO: 1 % — SIGNIFICANT CHANGE UP (ref 0–6)
GAS PNL BLDA: SIGNIFICANT CHANGE UP
GLUCOSE BLDC GLUCOMTR-MCNC: 115 MG/DL — HIGH (ref 70–99)
GLUCOSE SERPL-MCNC: 135 MG/DL — HIGH (ref 70–99)
HCT VFR BLD CALC: 25.5 % — LOW (ref 34.5–45)
HGB BLD-MCNC: 8.7 G/DL — LOW (ref 11.5–15.5)
INR BLD: 1.17 RATIO — HIGH (ref 0.88–1.16)
LYMPHOCYTES # BLD AUTO: 42.2 % — SIGNIFICANT CHANGE UP (ref 13–44)
LYMPHOCYTES # BLD AUTO: 5.4 K/UL — HIGH (ref 1–3.3)
MAGNESIUM SERPL-MCNC: 2.2 MG/DL — SIGNIFICANT CHANGE UP (ref 1.6–2.6)
MCHC RBC-ENTMCNC: 28.1 PG — SIGNIFICANT CHANGE UP (ref 27–34)
MCHC RBC-ENTMCNC: 33.9 GM/DL — SIGNIFICANT CHANGE UP (ref 32–36)
MCV RBC AUTO: 82.9 FL — SIGNIFICANT CHANGE UP (ref 80–100)
MONOCYTES # BLD AUTO: 0.9 K/UL — SIGNIFICANT CHANGE UP (ref 0–0.9)
MONOCYTES NFR BLD AUTO: 6.8 % — SIGNIFICANT CHANGE UP (ref 2–14)
NEUTROPHILS # BLD AUTO: 6.3 K/UL — SIGNIFICANT CHANGE UP (ref 1.8–7.4)
NEUTROPHILS NFR BLD AUTO: 49.4 % — SIGNIFICANT CHANGE UP (ref 43–77)
PHOSPHATE SERPL-MCNC: 3.7 MG/DL — SIGNIFICANT CHANGE UP (ref 2.5–4.5)
PLATELET # BLD AUTO: 335 K/UL — SIGNIFICANT CHANGE UP (ref 150–400)
POTASSIUM SERPL-MCNC: 3.5 MMOL/L — SIGNIFICANT CHANGE UP (ref 3.5–5.3)
POTASSIUM SERPL-SCNC: 3.5 MMOL/L — SIGNIFICANT CHANGE UP (ref 3.5–5.3)
PROT SERPL-MCNC: 7.2 G/DL — SIGNIFICANT CHANGE UP (ref 6–8.3)
PROTHROM AB SERPL-ACNC: 12.7 SEC — SIGNIFICANT CHANGE UP (ref 9.8–12.7)
RBC # BLD: 3.08 M/UL — LOW (ref 3.8–5.2)
RBC # FLD: 16.3 % — HIGH (ref 10.3–14.5)
SODIUM SERPL-SCNC: 139 MMOL/L — SIGNIFICANT CHANGE UP (ref 135–145)
TROPONIN T SERPL-MCNC: <0.01 NG/ML — SIGNIFICANT CHANGE UP (ref 0–0.06)
WBC # BLD: 12.7 K/UL — HIGH (ref 3.8–10.5)
WBC # FLD AUTO: 12.7 K/UL — HIGH (ref 3.8–10.5)

## 2017-10-28 PROCEDURE — 70450 CT HEAD/BRAIN W/O DYE: CPT | Mod: 26

## 2017-10-28 PROCEDURE — 12345: CPT | Mod: NC

## 2017-10-28 PROCEDURE — 99291 CRITICAL CARE FIRST HOUR: CPT

## 2017-10-28 PROCEDURE — 93010 ELECTROCARDIOGRAM REPORT: CPT

## 2017-10-28 RX ORDER — POTASSIUM CHLORIDE 20 MEQ
20 PACKET (EA) ORAL
Qty: 0 | Refills: 0 | Status: COMPLETED | OUTPATIENT
Start: 2017-10-28 | End: 2017-10-28

## 2017-10-28 RX ORDER — SODIUM CHLORIDE 9 MG/ML
500 INJECTION INTRAMUSCULAR; INTRAVENOUS; SUBCUTANEOUS ONCE
Qty: 0 | Refills: 0 | Status: COMPLETED | OUTPATIENT
Start: 2017-10-28 | End: 2017-10-28

## 2017-10-28 RX ORDER — FERROUS SULFATE 325(65) MG
325 TABLET ORAL DAILY
Qty: 0 | Refills: 0 | Status: DISCONTINUED | OUTPATIENT
Start: 2017-10-28 | End: 2017-10-31

## 2017-10-28 RX ORDER — SODIUM CHLORIDE 9 MG/ML
1000 INJECTION INTRAMUSCULAR; INTRAVENOUS; SUBCUTANEOUS
Qty: 0 | Refills: 0 | Status: DISCONTINUED | OUTPATIENT
Start: 2017-10-28 | End: 2017-10-30

## 2017-10-28 RX ORDER — MIDODRINE HYDROCHLORIDE 2.5 MG/1
5 TABLET ORAL EVERY 8 HOURS
Qty: 0 | Refills: 0 | Status: DISCONTINUED | OUTPATIENT
Start: 2017-10-28 | End: 2017-10-31

## 2017-10-28 RX ADMIN — Medication 650 MILLIGRAM(S): at 17:52

## 2017-10-28 RX ADMIN — Medication 20 MILLIEQUIVALENT(S): at 22:12

## 2017-10-28 RX ADMIN — Medication 100 MILLIGRAM(S): at 05:22

## 2017-10-28 RX ADMIN — Medication 100 MILLIGRAM(S): at 22:10

## 2017-10-28 RX ADMIN — MIDODRINE HYDROCHLORIDE 5 MILLIGRAM(S): 2.5 TABLET ORAL at 22:10

## 2017-10-28 RX ADMIN — Medication 650 MILLIGRAM(S): at 18:02

## 2017-10-28 RX ADMIN — Medication 325 MILLIGRAM(S): at 17:47

## 2017-10-28 RX ADMIN — SODIUM CHLORIDE 1000 MILLILITER(S): 9 INJECTION INTRAMUSCULAR; INTRAVENOUS; SUBCUTANEOUS at 11:00

## 2017-10-28 RX ADMIN — Medication 400 MILLIGRAM(S): at 22:12

## 2017-10-28 RX ADMIN — Medication 50 MICROGRAM(S): at 05:22

## 2017-10-28 RX ADMIN — Medication 4 MILLIGRAM(S): at 05:22

## 2017-10-28 RX ADMIN — Medication 650 MILLIGRAM(S): at 23:42

## 2017-10-28 RX ADMIN — SODIUM CHLORIDE 80 MILLILITER(S): 9 INJECTION INTRAMUSCULAR; INTRAVENOUS; SUBCUTANEOUS at 11:41

## 2017-10-28 RX ADMIN — PANTOPRAZOLE SODIUM 40 MILLIGRAM(S): 20 TABLET, DELAYED RELEASE ORAL at 05:22

## 2017-10-28 RX ADMIN — Medication 20 MILLIEQUIVALENT(S): at 17:48

## 2017-10-28 RX ADMIN — Medication 4 MILLIGRAM(S): at 17:48

## 2017-10-28 RX ADMIN — LEVETIRACETAM 500 MILLIGRAM(S): 250 TABLET, FILM COATED ORAL at 17:48

## 2017-10-28 RX ADMIN — Medication 100 MILLIGRAM(S): at 14:06

## 2017-10-28 RX ADMIN — ENOXAPARIN SODIUM 40 MILLIGRAM(S): 100 INJECTION SUBCUTANEOUS at 17:48

## 2017-10-28 RX ADMIN — LEVETIRACETAM 500 MILLIGRAM(S): 250 TABLET, FILM COATED ORAL at 05:22

## 2017-10-28 RX ADMIN — OXYCODONE AND ACETAMINOPHEN 1 TABLET(S): 5; 325 TABLET ORAL at 00:00

## 2017-10-28 NOTE — PROGRESS NOTE ADULT - PROBLEM SELECTOR PLAN 2
Hg stable 8.7 this morning. No active bleeding currently.   - Suspect DEVORA. Iron studies ordered -pending collection.  Monitor CBC daily.

## 2017-10-28 NOTE — PROGRESS NOTE ADULT - ASSESSMENT
40 yo female PMHX POD#4 of epidermoid tumor resection c/b anemia. RRT on 10/28 for syncope, likely due to orthostatic hypotension.

## 2017-10-28 NOTE — CHART NOTE - NSCHARTNOTEFT_GEN_A_CORE
10/28/17     0428    Critical Care Note--Medicine Attending    See Rapid Response Sheet for detailed assessment and plan.  I personally provided 30 minutes of critical care time for syncope>> with no lateralizing neurologic signs, speech fluent.  Patient is S/P neurosurgical procedure.  Placed on telemetry.  IVF.  Head CTT no contrast.  NS in attendance and aware.  Discussed with house staff>>spouse in attendance aware and agree with plan/care as above.      Mu Boothe MD  [facility]

## 2017-10-28 NOTE — CONSULT NOTE ADULT - SUBJECTIVE AND OBJECTIVE BOX
CHIEF COMPLAINT:Patient is a 41y old  Female who presents with a chief complaint of "I have a tumor in my brain" (24 Oct 2017 06:47)      HISTORY OF PRESENT ILLNESS:  this is a pleasant woman with epidermoid tumor resection c/b anemia. RRT on 10/28 for syncope, likely due to orthostatic hypotension. 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 650 milliGRAM(s) Oral every 6 hours PRN  carBAMazepine XR Tablet 400 milliGRAM(s) Oral at bedtime  levETIRAcetam 500 milliGRAM(s) Oral two times a day  ondansetron Injectable 4 milliGRAM(s) IV Push every 6 hours PRN  oxyCODONE    5 mG/acetaminophen 325 mG 1 Tablet(s) Oral every 4 hours PRN  oxyCODONE    5 mG/acetaminophen 325 mG 2 Tablet(s) Oral every 6 hours PRN  prochlorperazine   Injectable 5 milliGRAM(s) IntraMuscular every 4 hours PRN    docusate sodium 100 milliGRAM(s) Oral three times a day  pantoprazole    Tablet 40 milliGRAM(s) Oral before breakfast    dexamethasone     Tablet 4 milliGRAM(s) Oral every 12 hours  dexamethasone     Tablet   Oral   levothyroxine 50 MICROGram(s) Oral daily    sodium chloride 0.9%. 1000 milliLiter(s) IV Continuous <Continuous>      FAMILY HISTORY:      Non-contributory    SOCIAL HISTORY:    No tobacco, drugs or etoh    Allergies    No Known Allergies    Intolerances    	    REVIEW OF SYSTEMS:  as above  The rest of the 14 points ROS reviewed and except above they are unremarkable.        PHYSICAL EXAM:  T(C): 36.8 (10-28-17 @ 09:00), Max: 36.8 (10-28-17 @ 08:26)  HR: 87 (10-28-17 @ 09:00) (64 - 91)  BP: 94/57 (10-28-17 @ 09:00) (94/57 - 114/74)  RR: 16 (10-28-17 @ 09:00) (16 - 18)  SpO2: 100% (10-28-17 @ 09:00) (97% - 100%)  Wt(kg): --  I&O's Summary    27 Oct 2017 07:01  -  28 Oct 2017 07:00  --------------------------------------------------------  IN: 0 mL / OUT: 650 mL / NET: -650 mL        Appearance: Normal	  HEENT:   Normal oral mucosa, PERRL, EOMI	  Cardiovascular: Normal S1 S2,    Murmur:   Neck: JVP normal  Respiratory: Lungs clear to auscultation  Gastrointestinal:  Soft, Non-tender, + BS	  Skin: normal   Neuro: No gross deficits.   Psychiatry:  Mood & affect appropriate  Ext: No edema    LABS/DATA:    TELEMETRY: 	    ECG:  	   	  CARDIAC MARKERS:  Troponin T, Serum: <0.01 ng/mL (10-28 @ 04:30)                                  8.7    12.7  )-----------( 335      ( 28 Oct 2017 04:30 )             25.5     10-28    139  |  100  |  12  ----------------------------<  135<H>  3.5   |  25  |  0.54    Ca    9.0      28 Oct 2017 04:30  Phos  3.7     10-28  Mg     2.2     10-28    TPro  7.2  /  Alb  3.7  /  TBili  0.2  /  DBili  x   /  AST  17  /  ALT  18  /  AlkPhos  65  10-28    proBNP:   Lipid Profile:   HgA1c:   TSH:

## 2017-10-28 NOTE — PROGRESS NOTE ADULT - SUBJECTIVE AND OBJECTIVE BOX
SUBJECTIVE:  Patient was seen and evaluated at bedside. Patient is resting comfortably in bed and is in no new acute distress. Tolerating diet, voiding, and is in no new distress.     OVERNIGHT EVENTS: Collapsed in bathroom and hypotensive.     Vital Signs Last 24 Hrs  T(C): 36.8 (28 Oct 2017 09:00), Max: 36.8 (28 Oct 2017 08:26)  T(F): 98.3 (28 Oct 2017 09:00), Max: 98.3 (28 Oct 2017 09:00)  HR: 87 (28 Oct 2017 09:00) (64 - 91)  BP: 94/57 (28 Oct 2017 09:00) (94/57 - 114/74)  BP(mean): --  RR: 16 (28 Oct 2017 09:00) (16 - 18)  SpO2: 100% (28 Oct 2017 09:00) (97% - 100%)    PHYSICAL EXAM:    General: No Acute Distress     Neurological: Awake, alert oriented to person, place and time, Following Commands, PERRL, EOMI, Face Symmetrical, Speech Fluent, Moving all extremities, Muscle Strength normal in all four extremities, No Drift, Sensation to Light Touch Intact    Pulmonary: Clear to Auscultation, No Rales, No Rhonchi, No Wheezes     Cardiovascular: S1, S2, Regular Rate and Rhythm     Gastrointestinal: Soft, Nontender, Nondistended     Extremities: No calf tenderness     Incision: clean and dry     LABS:                        8.7    12.7  )-----------( 335      ( 28 Oct 2017 04:30 )             25.5    10-28    139  |  100  |  12  ----------------------------<  135<H>  3.5   |  25  |  0.54    Ca    9.0      28 Oct 2017 04:30  Phos  3.7     10-28  Mg     2.2     10-28    TPro  7.2  /  Alb  3.7  /  TBili  0.2  /  DBili  x   /  AST  17  /  ALT  18  /  AlkPhos  65  10-28  PT/INR - ( 28 Oct 2017 04:30 )   PT: 12.7 sec;   INR: 1.17 ratio         PTT - ( 28 Oct 2017 04:30 )  PTT:27.7 sec  Hemoglobin A1C, Whole Blood: 5.6 % (10-26 @ 04:19)      10-27 @ 07:01  -  10-28 @ 07:00  --------------------------------------------------------  IN: 0 mL / OUT: 650 mL / NET: -650 mL      DRAINS:     MEDICATIONS:  Antibiotics:    Neuro:  acetaminophen   Tablet. 650 milliGRAM(s) Oral every 6 hours PRN Mild Pain (1 - 3)  carBAMazepine XR Tablet 400 milliGRAM(s) Oral at bedtime  levETIRAcetam 500 milliGRAM(s) Oral two times a day  ondansetron Injectable 4 milliGRAM(s) IV Push every 6 hours PRN Nausea and/or Vomiting  oxyCODONE    5 mG/acetaminophen 325 mG 1 Tablet(s) Oral every 4 hours PRN Moderate Pain  oxyCODONE    5 mG/acetaminophen 325 mG 2 Tablet(s) Oral every 6 hours PRN Severe Pain  prochlorperazine   Injectable 5 milliGRAM(s) IntraMuscular every 4 hours PRN Nausea/Vomiting    Cardiac:    Pulm:    GI/:  docusate sodium 100 milliGRAM(s) Oral three times a day  pantoprazole    Tablet 40 milliGRAM(s) Oral before breakfast    Other:   dexamethasone     Tablet 4 milliGRAM(s) Oral every 12 hours  dexamethasone     Tablet   Oral   enoxaparin Injectable 40 milliGRAM(s) SubCutaneous <User Schedule>  levothyroxine 50 MICROGram(s) Oral daily  sodium chloride 0.9%. 1000 milliLiter(s) IV Continuous <Continuous>    DIET: [] Regular [] CCD [] Renal [] Puree [] Dysphagia [] Tube Feeds: regular     IMAGING: ct head : stable 10/28

## 2017-10-28 NOTE — PROGRESS NOTE ADULT - PROBLEM SELECTOR PLAN 1
This morning, SBP dropped from 125 sitting to 101 standing, with symptoms of dizziness.   S/p 1 liter NS bolus.  C/w NS IV 80 cc/hr  Check Orthostatics daily. This morning, SBP dropped from 125 sitting to 101 standing, with symptoms of dizziness.   S/p 1 liter NS bolus.  C/w NS IV 80 cc/hr  Check Orthostatics daily.  CT head was performed just now (pending results) - looking for acute bleeding. This morning, SBP dropped from 125 sitting to 101 standing, with symptoms of dizziness.   S/p 1 liter NS bolus.  C/w NS IV 80 cc/hr  D/c cardura  Check Orthostatics daily.  CT head was performed just now (pending results) - looking for acute bleeding.

## 2017-10-28 NOTE — PROGRESS NOTE ADULT - PROBLEM SELECTOR PLAN 1
s/p resection   1 Out of bed   2 Continue PT  3 PRN pain meds   4 check orthostatic   5 continue ivf   6 bolus 500 X1  7 decadron taper   8 dc cardura   9 continue tegretol   10 continue synthroid   11 regular diet   12 continue keppra for seizure prevention   13 dvt ppx sql /scds   14 dispo : ar pending

## 2017-10-28 NOTE — PROGRESS NOTE ADULT - ASSESSMENT
HPI:  40 yo Slovak speaking female from [place], reports pain on right side of face for five years, has had wisdom teeth extractions and other dental work without relief. Pt. 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 Pt. presents to [facility] today for Right Petrosal Craniotomy, removal of epidermoid, harvest of fat graft on 10/24/17. Pt. continues to have pain on right side of face, sometimes is hard to talk, denies recent fever, chills, chest pain, SOB, changes in bowel/urinary habits.  Pt. had a physical at PCP's office, lab work from 10/11/17 revealed a TSH level of 6.17 pt. started levothyroxine 50 mcg yesterday 10/16/17. Spoke with VERONICA Bell to Dr. Thorpe, he is aware of TSH levels and that patient just started medication. Case d/w Dr. Gonzalez (17 Oct 2017 08:46)      s/p right petrosal craniotomy and resection of epidermoid lesion and fat graft closure 10/24

## 2017-10-28 NOTE — CONSULT NOTE ADULT - ASSESSMENT
Syncope  likely orthostatic hypotension    Plan:  check orthostatic vitals  Chaz echo  compression stockings  given her baseline hypotension, start midodrine 5 mg TID.
42 yo female PMHX POD#3 of epidermoid tumor resection c/b anemia

## 2017-10-28 NOTE — CHART NOTE - NSCHARTNOTEFT_GEN_A_CORE
RRT called for syncope.     41yof hx of hypothyroid, trigeminal neuralgia, recent craniotomy for removal of epidermoid (Post op Day 4) went to bathroom and was found to be on the floor-unwitnessed. Per nurses who found patient, patient was unresponsive for less than a minute. Patient currently at baseline, responding to commands and AO x 3. She does not remember the episode-denies chest pain, dizziness, SOB, palpitations, prior to episode. Per chart review, patient was complaining of dizziness on ambulating today. Was also being worked up for anemia.     BP was 90/60 -concern for vasovagal episode vs. hypotension. All other vitals stable.     Plan:  -CBC, CMP, coags, cardiac enzymes  -EKG  -tele monitoring.   -CT head  -1 L normal saline bolus for hypotension. Primary team to reassess BP post fluids.     [facility]  [phone] RRT called for syncope.     41yof hx of hypothyroid, trigeminal neuralgia, recent craniotomy for removal of epidermoid (Post op Day 4) went to bathroom and was found to be on the floor-unwitnessed. Per nurses who found patient, patient was unresponsive for less than a minute. Patient did not hit her head per . Patient currently at baseline, responding to commands and AO x 3. She does not remember the episode-denies chest pain, dizziness, SOB, palpitations, prior to episode. Per chart review, patient was complaining of dizziness on ambulating today. Was also being worked up for anemia.     BP was 90/60 -concern for vasovagal episode vs. hypotension. All other vitals stable.     Plan:  -CBC, CMP, coags, cardiac enzymes  -EKG  -tele monitoring.   -CT head  -1 L normal saline bolus for hypotension. Primary team to reassess BP post fluids.   -activity changed to bed rest for now and to be reassessed in AM.     [facility] [facility]  [phone]

## 2017-10-28 NOTE — PROGRESS NOTE ADULT - SUBJECTIVE AND OBJECTIVE BOX
Patient is a 41y old  Female who presents with a chief complaint of "I have a tumor in my brain" (24 Oct 2017 06:47)      SUBJECTIVE / OVERNIGHT EVENTS: The patient had a RRT overnight due to syncope. C/o feeling dizzy when standing.     MEDICATIONS  (STANDING):  carBAMazepine XR Tablet 400 milliGRAM(s) Oral at bedtime  dexamethasone     Tablet 4 milliGRAM(s) Oral every 12 hours  dexamethasone     Tablet   Oral   docusate sodium 100 milliGRAM(s) Oral three times a day  doxazosin 2 milliGRAM(s) Oral at bedtime  enoxaparin Injectable 40 milliGRAM(s) SubCutaneous <User Schedule>  levETIRAcetam 500 milliGRAM(s) Oral two times a day  levothyroxine 50 MICROGram(s) Oral daily  pantoprazole    Tablet 40 milliGRAM(s) Oral before breakfast  sodium chloride 0.9%. 1000 milliLiter(s) (80 mL/Hr) IV Continuous <Continuous>    MEDICATIONS  (PRN):  acetaminophen   Tablet. 650 milliGRAM(s) Oral every 6 hours PRN Mild Pain (1 - 3)  ondansetron Injectable 4 milliGRAM(s) IV Push every 6 hours PRN Nausea and/or Vomiting  oxyCODONE    5 mG/acetaminophen 325 mG 1 Tablet(s) Oral every 4 hours PRN Moderate Pain  oxyCODONE    5 mG/acetaminophen 325 mG 2 Tablet(s) Oral every 6 hours PRN Severe Pain  prochlorperazine   Injectable 5 milliGRAM(s) IntraMuscular every 4 hours PRN Nausea/Vomiting      Vital Signs Last 24 Hrs  T(C): 36.8 (28 Oct 2017 09:00), Max: 36.8 (28 Oct 2017 08:26)  T(F): 98.3 (28 Oct 2017 09:00), Max: 98.3 (28 Oct 2017 09:00)  HR: 87 (28 Oct 2017 09:00) (64 - 91)  BP: 94/57 (28 Oct 2017 09:00) (94/57 - 114/74)  BP(mean): --  RR: 16 (28 Oct 2017 09:00) (16 - 18)  SpO2: 100% (28 Oct 2017 09:00) (97% - 100%)  CAPILLARY BLOOD GLUCOSE      POCT Blood Glucose.: 115 mg/dL (28 Oct 2017 04:10)    I&O's Summary    27 Oct 2017 07:01  -  28 Oct 2017 07:00  --------------------------------------------------------  IN: 0 mL / OUT: 650 mL / NET: -650 mL        PHYSICAL EXAM:  GENERAL: NAD, well-developed  EYES: EOMI, PERRLA, conjunctiva and sclera clear.  No nystagmus on exam.  NECK: Supple, No JVD  CHEST/LUNG: Clear to auscultation bilaterally; No wheeze  HEART: Regular rate and rhythm; No murmurs, rubs, or gallops  ABDOMEN: Soft, Nontender, Nondistended; Bowel sounds present  EXTREMITIES:  2+ Peripheral Pulses, No clubbing, cyanosis, or edema  PSYCH: AAOx3  NEUROLOGY: non-focal        LABS:                        8.7    12.7  )-----------( 335      ( 28 Oct 2017 04:30 )             25.5     10-28    139  |  100  |  12  ----------------------------<  135<H>  3.5   |  25  |  0.54    Ca    9.0      28 Oct 2017 04:30  Phos  3.7     10-28  Mg     2.2     10-28    TPro  7.2  /  Alb  3.7  /  TBili  0.2  /  DBili  x   /  AST  17  /  ALT  18  /  AlkPhos  65  10-28    PT/INR - ( 28 Oct 2017 04:30 )   PT: 12.7 sec;   INR: 1.17 ratio         PTT - ( 28 Oct 2017 04:30 )  PTT:27.7 sec  CARDIAC MARKERS ( 28 Oct 2017 04:30 )  x     / <0.01 ng/mL / 50 U/L / x     / 1.0 ng/mL          RADIOLOGY & ADDITIONAL TESTS:    Imaging Personally Reviewed:  CT head    Consultant(s) Notes Reviewed: Neurosurgery    Care Discussed with Consultants/Other Providers: Neurosurgery

## 2017-10-29 ENCOUNTER — TRANSCRIPTION ENCOUNTER (OUTPATIENT)
Age: 42
End: 2017-10-29

## 2017-10-29 LAB
ANION GAP SERPL CALC-SCNC: 13 MMOL/L — SIGNIFICANT CHANGE UP (ref 5–17)
BUN SERPL-MCNC: 10 MG/DL — SIGNIFICANT CHANGE UP (ref 7–23)
CALCIUM SERPL-MCNC: 8.7 MG/DL — SIGNIFICANT CHANGE UP (ref 8.4–10.5)
CHLORIDE SERPL-SCNC: 105 MMOL/L — SIGNIFICANT CHANGE UP (ref 96–108)
CO2 SERPL-SCNC: 23 MMOL/L — SIGNIFICANT CHANGE UP (ref 22–31)
CREAT SERPL-MCNC: 0.44 MG/DL — LOW (ref 0.5–1.3)
GLUCOSE SERPL-MCNC: 94 MG/DL — SIGNIFICANT CHANGE UP (ref 70–99)
HCT VFR BLD CALC: 23.6 % — LOW (ref 34.5–45)
HGB BLD-MCNC: 7.5 G/DL — LOW (ref 11.5–15.5)
MCHC RBC-ENTMCNC: 25.7 PG — LOW (ref 27–34)
MCHC RBC-ENTMCNC: 31.8 GM/DL — LOW (ref 32–36)
MCV RBC AUTO: 80.8 FL — SIGNIFICANT CHANGE UP (ref 80–100)
PLATELET # BLD AUTO: 368 K/UL — SIGNIFICANT CHANGE UP (ref 150–400)
POTASSIUM SERPL-MCNC: 3.8 MMOL/L — SIGNIFICANT CHANGE UP (ref 3.5–5.3)
POTASSIUM SERPL-SCNC: 3.8 MMOL/L — SIGNIFICANT CHANGE UP (ref 3.5–5.3)
RBC # BLD: 2.92 M/UL — LOW (ref 3.8–5.2)
RBC # FLD: 17.4 % — HIGH (ref 10.3–14.5)
SODIUM SERPL-SCNC: 141 MMOL/L — SIGNIFICANT CHANGE UP (ref 135–145)
WBC # BLD: 8.86 K/UL — SIGNIFICANT CHANGE UP (ref 3.8–10.5)
WBC # FLD AUTO: 8.86 K/UL — SIGNIFICANT CHANGE UP (ref 3.8–10.5)

## 2017-10-29 PROCEDURE — 99233 SBSQ HOSP IP/OBS HIGH 50: CPT

## 2017-10-29 RX ADMIN — Medication 650 MILLIGRAM(S): at 06:14

## 2017-10-29 RX ADMIN — OXYCODONE AND ACETAMINOPHEN 1 TABLET(S): 5; 325 TABLET ORAL at 15:44

## 2017-10-29 RX ADMIN — OXYCODONE AND ACETAMINOPHEN 1 TABLET(S): 5; 325 TABLET ORAL at 22:09

## 2017-10-29 RX ADMIN — Medication 4 MILLIGRAM(S): at 06:10

## 2017-10-29 RX ADMIN — LEVETIRACETAM 500 MILLIGRAM(S): 250 TABLET, FILM COATED ORAL at 06:10

## 2017-10-29 RX ADMIN — Medication 100 MILLIGRAM(S): at 11:08

## 2017-10-29 RX ADMIN — Medication 650 MILLIGRAM(S): at 00:00

## 2017-10-29 RX ADMIN — Medication 100 MILLIGRAM(S): at 22:10

## 2017-10-29 RX ADMIN — Medication 4 MILLIGRAM(S): at 17:31

## 2017-10-29 RX ADMIN — MIDODRINE HYDROCHLORIDE 5 MILLIGRAM(S): 2.5 TABLET ORAL at 22:10

## 2017-10-29 RX ADMIN — Medication 50 MICROGRAM(S): at 06:10

## 2017-10-29 RX ADMIN — MIDODRINE HYDROCHLORIDE 5 MILLIGRAM(S): 2.5 TABLET ORAL at 06:10

## 2017-10-29 RX ADMIN — MIDODRINE HYDROCHLORIDE 5 MILLIGRAM(S): 2.5 TABLET ORAL at 15:58

## 2017-10-29 RX ADMIN — LEVETIRACETAM 500 MILLIGRAM(S): 250 TABLET, FILM COATED ORAL at 17:31

## 2017-10-29 RX ADMIN — PANTOPRAZOLE SODIUM 40 MILLIGRAM(S): 20 TABLET, DELAYED RELEASE ORAL at 06:10

## 2017-10-29 RX ADMIN — Medication 400 MILLIGRAM(S): at 22:10

## 2017-10-29 RX ADMIN — Medication 325 MILLIGRAM(S): at 11:09

## 2017-10-29 RX ADMIN — ENOXAPARIN SODIUM 40 MILLIGRAM(S): 100 INJECTION SUBCUTANEOUS at 17:31

## 2017-10-29 RX ADMIN — Medication 100 MILLIGRAM(S): at 06:10

## 2017-10-29 NOTE — PROGRESS NOTE ADULT - PROBLEM SELECTOR PLAN 2
Hg around 8 +/-. Continue to monitor CBC daily. No active bleeding currently.   - Suspect DEVORA. Iron studies ordered for tomorrow morning.  Continue FeSO4 daily.

## 2017-10-29 NOTE — DISCHARGE NOTE ADULT - MEDICATION SUMMARY - MEDICATIONS TO TAKE
I will START or STAY ON the medications listed below when I get home from the hospital:    rolling walker  -- s/p Right Crani/ resection epidermoid cyst  -- Indication: For walker     dexamethasone 2 mg oral tablet  -- 1 tab(s) by mouth 2 times a day   -- It is very important that you take or use this exactly as directed.  Do not skip doses or discontinue unless directed by your doctor.  Obtain medical advice before taking any non-prescription drugs as some may affect the action of this medication.  Take with food or milk.    -- Indication: For brain edema     oxyCODONE-acetaminophen 5 mg-325 mg oral tablet  -- 1 tab(s) by mouth every 4 hours, As needed, Moderate Pain MDD:10 tabs  -- Indication: For pain    levETIRAcetam 500 mg oral tablet  -- 1 tab(s) by mouth 2 times a day  -- Indication: For Seizure prevention    carBAMazepine 400 mg oral tablet, extended release  -- 1 tab(s) by mouth once a day (at bedtime)  -- Indication: For facial pain     ferrous sulfate 325 mg (65 mg elemental iron) oral tablet  -- 1 tab(s) by mouth once a day (in the evening)  -- Indication: For Iron deficiency anemia    midodrine 5 mg oral tablet  -- 1 tab(s) by mouth every 8 hours  -- Indication: For blood pressure     pantoprazole 40 mg oral delayed release tablet  -- 1 tab(s) by mouth once a day (before a meal)  -- Indication: For Stomach upset     levothyroxine 50 mcg (0.05 mg) oral tablet  -- 1 tab(s) by mouth once a day. First dose 10/16/17  -- Indication: For Thyroid hormone

## 2017-10-29 NOTE — DISCHARGE NOTE ADULT - NS AS ACTIVITY OBS
Do not drive or operate machinery/Stairs allowed/Showering allowed/Walking-Indoors allowed/No Heavy lifting/straining/Walking-Outdoors allowed

## 2017-10-29 NOTE — PROGRESS NOTE ADULT - SUBJECTIVE AND OBJECTIVE BOX
Patient is a 41y old  Female who presents with a chief complaint of "I have a tumor in my brain" (24 Oct 2017 06:47)      SUBJECTIVE / OVERNIGHT EVENTS: No events overnight. She states that she's able to walk this morning without feeling dizzy. However, she complains of dizziness (not lightheadedness) when moving her head front or back. Resolves with keeping her head still.     MEDICATIONS  (STANDING):  carBAMazepine XR Tablet 400 milliGRAM(s) Oral at bedtime  dexamethasone     Tablet 4 milliGRAM(s) Oral every 12 hours  dexamethasone     Tablet   Oral   docusate sodium 100 milliGRAM(s) Oral three times a day  enoxaparin Injectable 40 milliGRAM(s) SubCutaneous <User Schedule>  ferrous    sulfate 325 milliGRAM(s) Oral daily  levETIRAcetam 500 milliGRAM(s) Oral two times a day  levothyroxine 50 MICROGram(s) Oral daily  midodrine 5 milliGRAM(s) Oral every 8 hours  pantoprazole    Tablet 40 milliGRAM(s) Oral before breakfast  sodium chloride 0.9%. 1000 milliLiter(s) (80 mL/Hr) IV Continuous <Continuous>    MEDICATIONS  (PRN):  acetaminophen   Tablet. 650 milliGRAM(s) Oral every 6 hours PRN Mild Pain (1 - 3)  ondansetron Injectable 4 milliGRAM(s) IV Push every 6 hours PRN Nausea and/or Vomiting  oxyCODONE    5 mG/acetaminophen 325 mG 1 Tablet(s) Oral every 4 hours PRN Moderate Pain  oxyCODONE    5 mG/acetaminophen 325 mG 2 Tablet(s) Oral every 6 hours PRN Severe Pain  prochlorperazine   Injectable 5 milliGRAM(s) IntraMuscular every 4 hours PRN Nausea/Vomiting      Vital Signs Last 24 Hrs  T(C): 36.8 (29 Oct 2017 12:46), Max: 37.2 (28 Oct 2017 23:27)  T(F): 98.2 (29 Oct 2017 12:46), Max: 98.9 (28 Oct 2017 23:27)  HR: 76 (29 Oct 2017 04:13) (76 - 89)  BP: 101/68 (29 Oct 2017 04:13) (96/67 - 117/72)  BP(mean): --  RR: 18 (29 Oct 2017 12:46) (18 - 18)  SpO2: 100% (29 Oct 2017 12:46) (97% - 100%)  CAPILLARY BLOOD GLUCOSE        I&O's Summary    28 Oct 2017 07:01  -  29 Oct 2017 07:00  --------------------------------------------------------  IN: 1120 mL / OUT: 1250 mL / NET: -130 mL    29 Oct 2017 07:01  -  29 Oct 2017 13:24  --------------------------------------------------------  IN: 240 mL / OUT: 0 mL / NET: 240 mL        PHYSICAL EXAM:  GENERAL: NAD, well-developed  EYES: EOMI, PERRLA, conjunctiva and sclera clear.  No nystagmus on exam.  NECK: Supple, No JVD  CHEST/LUNG: Clear to auscultation bilaterally; No wheeze  HEART: Regular rate and rhythm; No murmurs, rubs, or gallops  ABDOMEN: Soft, Nontender, Nondistended; Bowel sounds present  EXTREMITIES:  2+ Peripheral Pulses, No clubbing, cyanosis, or edema  PSYCH: AAOx3  NEUROLOGY: non-focal      LABS:                        7.5    8.86  )-----------( 368      ( 29 Oct 2017 08:35 )             23.6     10-29    141  |  105  |  10  ----------------------------<  94  3.8   |  23  |  0.44<L>    Ca    8.7      29 Oct 2017 08:53  Phos  3.7     10-28  Mg     2.2     10-28    TPro  7.2  /  Alb  3.7  /  TBili  0.2  /  DBili  x   /  AST  17  /  ALT  18  /  AlkPhos  65  10-28    PT/INR - ( 28 Oct 2017 04:30 )   PT: 12.7 sec;   INR: 1.17 ratio         PTT - ( 28 Oct 2017 04:30 )  PTT:27.7 sec  CARDIAC MARKERS ( 28 Oct 2017 04:30 )  x     / <0.01 ng/mL / 50 U/L / x     / 1.0 ng/mL          RADIOLOGY & ADDITIONAL TESTS:    CT head stable - no acute bleeding     Consultant(s) Notes Reviewed: Neurosurgery    Care Discussed with Consultants/Other Providers: Neurosurgery re: plan

## 2017-10-29 NOTE — PROGRESS NOTE ADULT - ASSESSMENT
40 yo female PMHX POD#5 of epidermoid tumor resection c/b anemia. RRT on 10/28 for syncope, likely due to orthostatic hypotension.

## 2017-10-29 NOTE — PROGRESS NOTE ADULT - PROBLEM SELECTOR PLAN 1
Yesterday had dizziness with SBP drop from 125 sitting to 101 standing. Orthostatic vital signs are normal now.   Cardura stopped yesterday.  Cardiology following - started on midodrine 5 PO q8h (consider stopping and monitor symptoms off this medications now that she's rehydrated and off Cardura).

## 2017-10-29 NOTE — PROGRESS NOTE ADULT - ASSESSMENT
40 yo Kuwaiti speaking female from [place], reports pain on right side of face for five years, has had wisdom teeth extractions and other dental work without relief. Pt. 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 s/p Right petrosal craniotomy approach./ Resection of epidermoid mass 10/24/17. Post op course significant for Syncope/ orthosstatic hypotension      Plan:  Neuro stable. F/U pathology. Decadron taper. Keppra for seizure prophylaxis.Continue Tegretol for trigeminal neuralgia.   Vitals . SBP 90-110s. Off cardura. Midodrine 5mg q8. F/u Dr Ingram outpatient for taper  PT/OT- Acute rehab. Needs PT reeval  DVT ppx  possible discharge in am

## 2017-10-29 NOTE — DISCHARGE NOTE ADULT - HOSPITAL COURSE
40 yo Cook Islander speaking female from [place], reports pain on right side of face for five years, has had wisdom teeth extractions and other dental work without relief. Pt. 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 s/p Right petrosal craniotomy approach./ Resection of epidermoid mass 10/24/17. Post op course significant for Syncope and fall with  orthostatic hypotension. CT head stable. Seen by medicine and cardiology. Started on Midodrine. Evaluated by PT and discharged --- 42 yo German speaking female from [place], reports pain on right side of face for five years, has had wisdom teeth extractions and other dental work without relief. Pt. 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 s/p Right petrosal craniotomy approach./ Resection of epidermoid mass 10/24/17. Post op course significant for Syncope and fall with  orthostatic hypotension. CT head stable. Seen by medicine and cardiology. Started on Midodrine. Evaluated by PT and discharged. Patient was discharged with follow up instructions.

## 2017-10-29 NOTE — DISCHARGE NOTE ADULT - PATIENT PORTAL LINK FT
“You can access the Follow[facility] Patient Portal, offered by [facility], by registering with the following website: [email or web address]”

## 2017-10-29 NOTE — DISCHARGE NOTE ADULT - SECONDARY DIAGNOSIS.
Hypothyroid Syncope due to orthostatic hypotension Trigeminal neuralgia of right side of face Anemia due to other cause

## 2017-10-29 NOTE — DISCHARGE NOTE ADULT - PLAN OF CARE
s/p Right petrosal craniotomy approach./ Resection of epidermoid mass 10/24/17. Keep Incision Clean and Dry. May shower post op day 5. pat dry after. no creams or lotions on incision. No immersion baths..  Incision evaluation and staple removal on 11/6/17  No strenous activity. No heavy lifting. Do not return to work until cleared by physician. No driving until cleared by physician. Continue synthroid  Repeat Thyroid function tests in 3-4 weeks  follow up with primary care Follow up with Dr Ingram to taper off Midodrine Continue Tegretol  Follow up with neurologist for possible tapering off tegretol Continue Iron supplement  Follow up with primary care Follow up with Dr. Thorpe -Neurosurgeon- in the office in 10 days-Please call office to confirm appointment.   Keep Incision Clean and Dry. May shower post op day 5. pat dry after. no creams or lotions on incision. No immersion baths..  Incision evaluation and staple removal on 11/6/17  No strenous activity. No heavy lifting. Do not return to work until cleared by physician. No driving until cleared by physician.

## 2017-10-29 NOTE — DISCHARGE NOTE ADULT - CARE PLAN
Principal Discharge DX:	Epidermoid cyst of brain  Goal:	s/p Right petrosal craniotomy approach./ Resection of epidermoid mass 10/24/17.  Instructions for follow-up, activity and diet:	Keep Incision Clean and Dry. May shower post op day 5. pat dry after. no creams or lotions on incision. No immersion baths..  Incision evaluation and staple removal on 11/6/17  No strenous activity. No heavy lifting. Do not return to work until cleared by physician. No driving until cleared by physician.  Secondary Diagnosis:	Hypothyroid  Instructions for follow-up, activity and diet:	Continue synthroid  Repeat Thyroid function tests in 3-4 weeks  follow up with primary care  Secondary Diagnosis:	Syncope due to orthostatic hypotension  Instructions for follow-up, activity and diet:	Follow up with Dr Ingram to taper off Midodrine  Secondary Diagnosis:	Trigeminal neuralgia of right side of face  Instructions for follow-up, activity and diet:	Continue Tegretol  Follow up with neurologist for possible tapering off tegretol  Secondary Diagnosis:	Anemia due to other cause  Instructions for follow-up, activity and diet:	Continue Iron supplement  Follow up with primary care Principal Discharge DX:	Epidermoid cyst of brain  Goal:	s/p Right petrosal craniotomy approach./ Resection of epidermoid mass 10/24/17.  Instructions for follow-up, activity and diet:	Follow up with Dr. Thorpe -Neurosurgeon- in the office in 10 days-Please call office to confirm appointment.   Keep Incision Clean and Dry. May shower post op day 5. pat dry after. no creams or lotions on incision. No immersion baths..  Incision evaluation and staple removal on 11/6/17  No strenous activity. No heavy lifting. Do not return to work until cleared by physician. No driving until cleared by physician.  Secondary Diagnosis:	Hypothyroid  Instructions for follow-up, activity and diet:	Continue synthroid  Repeat Thyroid function tests in 3-4 weeks  follow up with primary care  Secondary Diagnosis:	Syncope due to orthostatic hypotension  Instructions for follow-up, activity and diet:	Follow up with Dr Ingram to taper off Midodrine  Secondary Diagnosis:	Trigeminal neuralgia of right side of face  Instructions for follow-up, activity and diet:	Continue Tegretol  Follow up with neurologist for possible tapering off tegretol  Secondary Diagnosis:	Anemia due to other cause  Instructions for follow-up, activity and diet:	Continue Iron supplement  Follow up with primary care

## 2017-10-29 NOTE — PROGRESS NOTE ADULT - SUBJECTIVE AND OBJECTIVE BOX
Subjective: Patient seen and examined. No new events except as noted.     SUBJECTIVE/ROS:    No chest pain, or sob.     MEDICATIONS:  MEDICATIONS  (STANDING):  carBAMazepine XR Tablet 400 milliGRAM(s) Oral at bedtime  dexamethasone     Tablet 4 milliGRAM(s) Oral every 12 hours  dexamethasone     Tablet   Oral   docusate sodium 100 milliGRAM(s) Oral three times a day  enoxaparin Injectable 40 milliGRAM(s) SubCutaneous <User Schedule>  ferrous    sulfate 325 milliGRAM(s) Oral daily  levETIRAcetam 500 milliGRAM(s) Oral two times a day  levothyroxine 50 MICROGram(s) Oral daily  midodrine 5 milliGRAM(s) Oral every 8 hours  pantoprazole    Tablet 40 milliGRAM(s) Oral before breakfast  sodium chloride 0.9%. 1000 milliLiter(s) (80 mL/Hr) IV Continuous <Continuous>      PHYSICAL EXAM:  T(C): 36.4 (10-29-17 @ 07:35), Max: 37.2 (10-28-17 @ 23:27)  HR: 76 (10-29-17 @ 04:13) (76 - 89)  BP: 101/68 (10-29-17 @ 04:13) (96/67 - 117/72)  RR: 18 (10-29-17 @ 07:35) (18 - 18)  SpO2: 97% (10-29-17 @ 07:35) (96% - 98%)  Wt(kg): --  I&O's Summary    28 Oct 2017 07:01  -  29 Oct 2017 07:00  --------------------------------------------------------  IN: 1120 mL / OUT: 1250 mL / NET: -130 mL          Appearance: Normal	  HEENT:   Normal oral mucosa, PERRL, EOMI	  Cardiovascular: Normal S1 S2,    Murmur:   Neck: JVP normal  Respiratory: Lungs clear to auscultation  Gastrointestinal:  Soft, Non-tender, + BS	  Skin: normal   Neuro: No gross deficits.   Psychiatry:  Mood & affect appropriate  Ext: No edema      LABS/DATA:    CARDIAC MARKERS:  CARDIAC MARKERS ( 28 Oct 2017 04:30 )  x     / <0.01 ng/mL / 50 U/L / x     / 1.0 ng/mL                                7.5    8.86  )-----------( 368      ( 29 Oct 2017 08:35 )             23.6     10-29    141  |  105  |  10  ----------------------------<  94  3.8   |  23  |  0.44<L>    Ca    8.7      29 Oct 2017 08:53  Phos  3.7     10-28  Mg     2.2     10-28    TPro  7.2  /  Alb  3.7  /  TBili  0.2  /  DBili  x   /  AST  17  /  ALT  18  /  AlkPhos  65  10-28    proBNP:   Lipid Profile:   HgA1c:   TSH:     TELE:  EKG:

## 2017-10-29 NOTE — PROGRESS NOTE ADULT - SUBJECTIVE AND OBJECTIVE BOX
SUBJECTIVE:   No dizziness. OOB to chair.   OVERNIGHT EVENTS: none    Vital Signs Last 24 Hrs  T(C): 37 (29 Oct 2017 15:06), Max: 37.2 (28 Oct 2017 23:27)  T(F): 98.6 (29 Oct 2017 15:06), Max: 98.9 (28 Oct 2017 23:27)  HR: 83 (29 Oct 2017 15:06) (76 - 89)  BP: 97/65 (29 Oct 2017 15:06) (96/67 - 117/72)  RR: 18 (29 Oct 2017 15:06) (18 - 18)  SpO2: 97% (29 Oct 2017 15:06) (97% - 100%)    PHYSICAL EXAM:    Constitutional: No Acute Distress     Neurological: AOx3, Following Commands, Moving all Extremities 5/5. No drift JIMMIE  Cranial staples C/D/I    Pulmonary: Clear to Auscultation,  Cardiovascular: S1, S2, Regular rate and rhythm     Gastrointestinal: Soft, Non-tender, Non-distended     Extremities: No calf tenderness         LABS:                        7.5    8.86  )-----------( 368      ( 29 Oct 2017 08:35 )             23.6    10-29    141  |  105  |  10  ----------------------------<  94  3.8   |  23  |  0.44<L>    Ca    8.7      29 Oct 2017 08:53  Phos  3.7     10-28  Mg     2.2     10-28            IMAGING:         MEDICATIONS:  acetaminophen   Tablet. 650 milliGRAM(s) Oral every 6 hours PRN Mild Pain (1 - 3)  carBAMazepine XR Tablet 400 milliGRAM(s) Oral at bedtime  levETIRAcetam 500 milliGRAM(s) Oral two times a day  ondansetron Injectable 4 milliGRAM(s) IV Push every 6 hours PRN Nausea and/or Vomiting  oxyCODONE    5 mG/acetaminophen 325 mG 1 Tablet(s) Oral every 4 hours PRN Moderate Pain  oxyCODONE    5 mG/acetaminophen 325 mG 2 Tablet(s) Oral every 6 hours PRN Severe Pain  midodrine 5 milliGRAM(s) Oral every 8 hours  docusate sodium 100 milliGRAM(s) Oral three times a day  pantoprazole    Tablet 40 milliGRAM(s) Oral before breakfast  dexamethasone     Tablet 4 milliGRAM(s) Oral every 12 hours  dexamethasone     Tablet   Oral   enoxaparin Injectable 40 milliGRAM(s) SubCutaneous <User Schedule>  ferrous    sulfate 325 milliGRAM(s) Oral daily  levothyroxine 50 MICROGram(s) Oral daily  sodium chloride 0.9%. 1000 milliLiter(s) IV Continuous <Continuous>      DIET:

## 2017-10-29 NOTE — DISCHARGE NOTE ADULT - CARE PROVIDER_API CALL
Claudio Thorpe), Neurological Surgery  [address]  [place] [address]  Phone: [phone]  Fax: [phone]    Cj Ingram), Cardiovascular Disease; Internal Medicine  [address]  [place] [address]  Phone: [phone]  Fax: [phone]

## 2017-10-30 LAB
FERRITIN SERPL-MCNC: 16 NG/ML — SIGNIFICANT CHANGE UP (ref 15–150)
IRON SATN MFR SERPL: 22 UG/DL — LOW (ref 30–160)
IRON SATN MFR SERPL: 8 % — LOW (ref 14–50)
SURGICAL PATHOLOGY STUDY: SIGNIFICANT CHANGE UP
TIBC SERPL-MCNC: 289 UG/DL — SIGNIFICANT CHANGE UP (ref 220–430)
UIBC SERPL-MCNC: 267 UG/DL — SIGNIFICANT CHANGE UP (ref 110–370)

## 2017-10-30 PROCEDURE — 99233 SBSQ HOSP IP/OBS HIGH 50: CPT

## 2017-10-30 RX ORDER — MIDODRINE HYDROCHLORIDE 2.5 MG/1
1 TABLET ORAL
Qty: 42 | Refills: 0 | OUTPATIENT
Start: 2017-10-30 | End: 2017-11-13

## 2017-10-30 RX ORDER — DEXAMETHASONE 0.5 MG/5ML
1 ELIXIR ORAL
Qty: 4 | Refills: 0 | OUTPATIENT
Start: 2017-10-30 | End: 2017-11-01

## 2017-10-30 RX ORDER — LEVETIRACETAM 250 MG/1
1 TABLET, FILM COATED ORAL
Qty: 28 | Refills: 0 | OUTPATIENT
Start: 2017-10-30 | End: 2017-11-13

## 2017-10-30 RX ORDER — PANTOPRAZOLE SODIUM 20 MG/1
1 TABLET, DELAYED RELEASE ORAL
Qty: 14 | Refills: 0 | OUTPATIENT
Start: 2017-10-30 | End: 2017-11-13

## 2017-10-30 RX ADMIN — OXYCODONE AND ACETAMINOPHEN 1 TABLET(S): 5; 325 TABLET ORAL at 17:32

## 2017-10-30 RX ADMIN — ENOXAPARIN SODIUM 40 MILLIGRAM(S): 100 INJECTION SUBCUTANEOUS at 17:32

## 2017-10-30 RX ADMIN — Medication 2 MILLIGRAM(S): at 17:35

## 2017-10-30 RX ADMIN — Medication 400 MILLIGRAM(S): at 23:03

## 2017-10-30 RX ADMIN — Medication 100 MILLIGRAM(S): at 23:01

## 2017-10-30 RX ADMIN — MIDODRINE HYDROCHLORIDE 5 MILLIGRAM(S): 2.5 TABLET ORAL at 23:03

## 2017-10-30 RX ADMIN — PANTOPRAZOLE SODIUM 40 MILLIGRAM(S): 20 TABLET, DELAYED RELEASE ORAL at 05:41

## 2017-10-30 RX ADMIN — Medication 2 MILLIGRAM(S): at 05:27

## 2017-10-30 RX ADMIN — Medication 100 MILLIGRAM(S): at 13:10

## 2017-10-30 RX ADMIN — MIDODRINE HYDROCHLORIDE 5 MILLIGRAM(S): 2.5 TABLET ORAL at 05:28

## 2017-10-30 RX ADMIN — MIDODRINE HYDROCHLORIDE 5 MILLIGRAM(S): 2.5 TABLET ORAL at 13:10

## 2017-10-30 RX ADMIN — OXYCODONE AND ACETAMINOPHEN 1 TABLET(S): 5; 325 TABLET ORAL at 05:27

## 2017-10-30 RX ADMIN — Medication 10 MILLIGRAM(S): at 14:51

## 2017-10-30 RX ADMIN — OXYCODONE AND ACETAMINOPHEN 1 TABLET(S): 5; 325 TABLET ORAL at 23:01

## 2017-10-30 RX ADMIN — Medication 100 MILLIGRAM(S): at 05:28

## 2017-10-30 RX ADMIN — Medication 50 MICROGRAM(S): at 05:28

## 2017-10-30 RX ADMIN — LEVETIRACETAM 500 MILLIGRAM(S): 250 TABLET, FILM COATED ORAL at 05:27

## 2017-10-30 RX ADMIN — LEVETIRACETAM 500 MILLIGRAM(S): 250 TABLET, FILM COATED ORAL at 17:32

## 2017-10-30 RX ADMIN — Medication 325 MILLIGRAM(S): at 13:10

## 2017-10-30 NOTE — PROGRESS NOTE ADULT - SUBJECTIVE AND OBJECTIVE BOX
Subjective: Patient seen and examined. No new events except as noted.     SUBJECTIVE/ROS:  feeling better       MEDICATIONS:  MEDICATIONS  (STANDING):  carBAMazepine XR Tablet 400 milliGRAM(s) Oral at bedtime  dexamethasone     Tablet 2 milliGRAM(s) Oral every 12 hours  dexamethasone     Tablet   Oral   docusate sodium 100 milliGRAM(s) Oral three times a day  enoxaparin Injectable 40 milliGRAM(s) SubCutaneous <User Schedule>  ferrous    sulfate 325 milliGRAM(s) Oral daily  levETIRAcetam 500 milliGRAM(s) Oral two times a day  levothyroxine 50 MICROGram(s) Oral daily  midodrine 5 milliGRAM(s) Oral every 8 hours  pantoprazole    Tablet 40 milliGRAM(s) Oral before breakfast  sodium chloride 0.9%. 1000 milliLiter(s) (80 mL/Hr) IV Continuous <Continuous>      PHYSICAL EXAM:  T(C): 36.6 (10-30-17 @ 07:21), Max: 37.3 (10-29-17 @ 22:06)  HR: 74 (10-30-17 @ 07:21) (72 - 83)  BP: 108/75 (10-30-17 @ 07:21) (97/65 - 116/77)  RR: 18 (10-30-17 @ 07:21) (18 - 18)  SpO2: 98% (10-30-17 @ 07:21) (95% - 100%)  Wt(kg): --  I&O's Summary    29 Oct 2017 07:01  -  30 Oct 2017 07:00  --------------------------------------------------------  IN: 1000 mL / OUT: 0 mL / NET: 1000 mL          Appearance: Normal	  HEENT:   Normal oral mucosa, PERRL, EOMI	  Cardiovascular: Normal S1 S2,    Murmur:   Neck: JVP normal  Respiratory: Lungs clear to auscultation  Gastrointestinal:  Soft, Non-tender, + BS	  Skin: normal   Neuro: No gross deficits.   Psychiatry:  Mood & affect appropriate  Ext: No edema      LABS/DATA:    CARDIAC MARKERS:  CARDIAC MARKERS ( 28 Oct 2017 04:30 )  x     / <0.01 ng/mL / 50 U/L / x     / 1.0 ng/mL                                7.5    8.86  )-----------( 368      ( 29 Oct 2017 08:35 )             23.6     10-29    141  |  105  |  10  ----------------------------<  94  3.8   |  23  |  0.44<L>    Ca    8.7      29 Oct 2017 08:53      proBNP:   Lipid Profile:   HgA1c:   TSH:     TELE:  EKG:

## 2017-10-30 NOTE — PROGRESS NOTE ADULT - PROBLEM SELECTOR PLAN 2
Hg around 8 +/-. Continue to monitor CBC daily. No active bleeding currently.   -iron studies significant for DEVORA  Continue FeSO4 daily.  -repeat CBC in 2-3 weeks as outpatient.

## 2017-10-30 NOTE — PROGRESS NOTE ADULT - ASSESSMENT
42 yo Panamanian speaking female from [place], reports pain on right side of face for five years, has had wisdom teeth extractions and other dental work without relief. Pt. 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 s/p Right petrosal craniotomy approach./ Resection of epidermoid mass 10/24/17. Post op course significant for Syncope/ orthosstatic hypotension    Neuro stable.  Decadron taper. Keppra for seizure prophylaxis.Continue Tegretol for trigeminal neuralgia.   Vitals . SBP 90-110s. Off cardura. Midodrine 5mg q8. F/u Dr Ingram outpatient for taper  PT/OT- Acute rehab. awaiting PT reeval  DVT ppx  Anemia- Continue FeSo4 tabs. F/U with primary care for anemia work up  possible discharge today if cleared by PT

## 2017-10-30 NOTE — PROGRESS NOTE ADULT - ASSESSMENT
Orthostatic Hypotension   improving   cont midodrine for now    Anemia  anemia work up as per med  consider transfusion

## 2017-10-30 NOTE — PROGRESS NOTE ADULT - SUBJECTIVE AND OBJECTIVE BOX
SUBJECTIVE:   OOB to chair, Ambulating to bathroom  OVERNIGHT EVENTS: none    Vital Signs Last 24 Hrs  T(C): 36.7 (30 Oct 2017 12:38), Max: 37.3 (29 Oct 2017 22:06)  T(F): 98 (30 Oct 2017 12:38), Max: 99.2 (29 Oct 2017 22:06)  HR: 83 (30 Oct 2017 12:38) (72 - 83)  BP: 97/63 (30 Oct 2017 12:38) (97/63 - 116/77)  RR: 18 (30 Oct 2017 12:38) (18 - 18)  SpO2: 99% (30 Oct 2017 12:38) (95% - 99%)    PHYSICAL EXAM:    Constitutional: No Acute Distress     Neurological: AOx3, Following Commands, Moving all Extremities 5/5 JIMMIE EOMI    Cranial staples/ sutures C/D/I  Pulmonary: Clear to Auscultation, No rales, No rhonchi, No wheezes     Cardiovascular: S1, S2, Regular rate and rhythm     Gastrointestinal: Soft, Non-tender, Non-distended     Extremities: No calf tenderness     LABS:                        7.5    8.86  )-----------( 368      ( 29 Oct 2017 08:35 )             23.6    10-29    141  |  105  |  10  ----------------------------<  94  3.8   |  23  |  0.44<L>    Ca    8.7      29 Oct 2017 08:53        Pathology- Epidermoid cyst      IMAGING:         MEDICATIONS:    acetaminophen   Tablet. 650 milliGRAM(s) Oral every 6 hours PRN Mild Pain (1 - 3)  carBAMazepine XR Tablet 400 milliGRAM(s) Oral at bedtime  levETIRAcetam 500 milliGRAM(s) Oral two times a day  ondansetron Injectable 4 milliGRAM(s) IV Push every 6 hours PRN Nausea and/or Vomiting  oxyCODONE    5 mG/acetaminophen 325 mG 1 Tablet(s) Oral every 4 hours PRN Moderate Pain  oxyCODONE    5 mG/acetaminophen 325 mG 2 Tablet(s) Oral every 6 hours PRN Severe Pain  midodrine 5 milliGRAM(s) Oral every 8 hours  bisacodyl 10 milliGRAM(s) Oral at bedtime  docusate sodium 100 milliGRAM(s) Oral three times a day  pantoprazole    Tablet 40 milliGRAM(s) Oral before breakfast  dexamethasone     Tablet 2 milliGRAM(s) Oral every 12 hours  enoxaparin Injectable 40 milliGRAM(s) SubCutaneous <User Schedule>  ferrous    sulfate 325 milliGRAM(s) Oral daily  levothyroxine 50 MICROGram(s) Oral daily      DIET:

## 2017-10-30 NOTE — PROGRESS NOTE ADULT - PROBLEM SELECTOR PLAN 1
repeat orthostatics negative  -continue midodrine.   -titrate as outpatient  -continue to hydrate at home.  -occasional dizzinessl ikely BPPV, would not start meclizine at thist shaji.

## 2017-10-30 NOTE — PROGRESS NOTE ADULT - SUBJECTIVE AND OBJECTIVE BOX
Patient is a 41y old  Female who presents with a chief complaint of "I have a tumor in my brain" (24 Oct 2017 06:47)      SUBJECTIVE / OVERNIGHT EVENTS: No events overnight.  No more lightheadedness.  No other compalints.     MEDICATIONS  (STANDING):  carBAMazepine XR Tablet 400 milliGRAM(s) Oral at bedtime  dexamethasone     Tablet 4 milliGRAM(s) Oral every 12 hours  dexamethasone     Tablet   Oral   docusate sodium 100 milliGRAM(s) Oral three times a day  enoxaparin Injectable 40 milliGRAM(s) SubCutaneous <User Schedule>  ferrous    sulfate 325 milliGRAM(s) Oral daily  levETIRAcetam 500 milliGRAM(s) Oral two times a day  levothyroxine 50 MICROGram(s) Oral daily  midodrine 5 milliGRAM(s) Oral every 8 hours  pantoprazole    Tablet 40 milliGRAM(s) Oral before breakfast  sodium chloride 0.9%. 1000 milliLiter(s) (80 mL/Hr) IV Continuous <Continuous>    MEDICATIONS  (PRN):  acetaminophen   Tablet. 650 milliGRAM(s) Oral every 6 hours PRN Mild Pain (1 - 3)  ondansetron Injectable 4 milliGRAM(s) IV Push every 6 hours PRN Nausea and/or Vomiting  oxyCODONE    5 mG/acetaminophen 325 mG 1 Tablet(s) Oral every 4 hours PRN Moderate Pain  oxyCODONE    5 mG/acetaminophen 325 mG 2 Tablet(s) Oral every 6 hours PRN Severe Pain  prochlorperazine   Injectable 5 milliGRAM(s) IntraMuscular every 4 hours PRN Nausea/Vomiting      VVital Signs Last 24 Hrs  T(C): 36.6 (30 Oct 2017 07:21), Max: 37.3 (29 Oct 2017 22:06)  T(F): 97.9 (30 Oct 2017 07:21), Max: 99.2 (29 Oct 2017 22:06)  HR: 74 (30 Oct 2017 07:21) (72 - 83)  BP: 108/75 (30 Oct 2017 07:21) (97/65 - 116/77)  BP(mean): --  RR: 18 (30 Oct 2017 07:21) (18 - 18)  SpO2: 98% (30 Oct 2017 07:21) (95% - 100%)      I&O's Summary    28 Oct 2017 07:01  -  29 Oct 2017 07:00  --------------------------------------------------------  IN: 1120 mL / OUT: 1250 mL / NET: -130 mL    29 Oct 2017 07:01  -  29 Oct 2017 13:24  --------------------------------------------------------  IN: 240 mL / OUT: 0 mL / NET: 240 mL        PHYSICAL EXAM:  GENERAL: NAD, well-developed  EYES: EOMI, PERRLA, conjunctiva and sclera clear.  No nystagmus on exam.  NECK: Supple, No JVD  CHEST/LUNG: Clear to auscultation bilaterally; No wheeze  HEART: Regular rate and rhythm; No murmurs, rubs, or gallops  ABDOMEN: Soft, Nontender, Nondistended; Bowel sounds present  EXTREMITIES:  2+ Peripheral Pulses, No clubbing, cyanosis, or edema  PSYCH: AAOx3  NEUROLOGY: non-focal      LABS:                                    7.5    8.86  )-----------( 368      ( 29 Oct 2017 08:35 )             23.6   10-29    141  |  105  |  10  ----------------------------<  94  3.8   |  23  |  0.44<L>    Ca    8.7      29 Oct 2017 08:53            RADIOLOGY & ADDITIONAL TESTS:    CT head stable - no acute bleeding     Consultant(s) Notes Reviewed: Neurosurgery    Care Discussed with Consultants/Other Providers: Neurosurgery re: plan

## 2017-10-31 VITALS
SYSTOLIC BLOOD PRESSURE: 103 MMHG | TEMPERATURE: 98 F | OXYGEN SATURATION: 98 % | HEART RATE: 80 BPM | DIASTOLIC BLOOD PRESSURE: 70 MMHG | RESPIRATION RATE: 18 BRPM

## 2017-10-31 LAB
HCT VFR BLD CALC: 26.7 % — LOW (ref 34.5–45)
HGB BLD-MCNC: 8.9 G/DL — LOW (ref 11.5–15.5)
MCHC RBC-ENTMCNC: 27.6 PG — SIGNIFICANT CHANGE UP (ref 27–34)
MCHC RBC-ENTMCNC: 33.3 GM/DL — SIGNIFICANT CHANGE UP (ref 32–36)
MCV RBC AUTO: 83 FL — SIGNIFICANT CHANGE UP (ref 80–100)
PLATELET # BLD AUTO: 448 K/UL — HIGH (ref 150–400)
RBC # BLD: 3.22 M/UL — LOW (ref 3.8–5.2)
RBC # FLD: 15.9 % — HIGH (ref 10.3–14.5)
WBC # BLD: 15.2 K/UL — HIGH (ref 3.8–10.5)
WBC # FLD AUTO: 15.2 K/UL — HIGH (ref 3.8–10.5)

## 2017-10-31 PROCEDURE — 83605 ASSAY OF LACTIC ACID: CPT

## 2017-10-31 PROCEDURE — 82553 CREATINE MB FRACTION: CPT

## 2017-10-31 PROCEDURE — 82947 ASSAY GLUCOSE BLOOD QUANT: CPT

## 2017-10-31 PROCEDURE — 97116 GAIT TRAINING THERAPY: CPT

## 2017-10-31 PROCEDURE — 82803 BLOOD GASES ANY COMBINATION: CPT

## 2017-10-31 PROCEDURE — 83735 ASSAY OF MAGNESIUM: CPT

## 2017-10-31 PROCEDURE — 70450 CT HEAD/BRAIN W/O DYE: CPT

## 2017-10-31 PROCEDURE — C1769: CPT

## 2017-10-31 PROCEDURE — C1889: CPT

## 2017-10-31 PROCEDURE — 85610 PROTHROMBIN TIME: CPT

## 2017-10-31 PROCEDURE — 82728 ASSAY OF FERRITIN: CPT

## 2017-10-31 PROCEDURE — 80053 COMPREHEN METABOLIC PANEL: CPT

## 2017-10-31 PROCEDURE — 97530 THERAPEUTIC ACTIVITIES: CPT

## 2017-10-31 PROCEDURE — 84295 ASSAY OF SERUM SODIUM: CPT

## 2017-10-31 PROCEDURE — 84100 ASSAY OF PHOSPHORUS: CPT

## 2017-10-31 PROCEDURE — 70553 MRI BRAIN STEM W/O & W/DYE: CPT

## 2017-10-31 PROCEDURE — 82550 ASSAY OF CK (CPK): CPT

## 2017-10-31 PROCEDURE — C1763: CPT

## 2017-10-31 PROCEDURE — 82962 GLUCOSE BLOOD TEST: CPT

## 2017-10-31 PROCEDURE — 97166 OT EVAL MOD COMPLEX 45 MIN: CPT

## 2017-10-31 PROCEDURE — 84132 ASSAY OF SERUM POTASSIUM: CPT

## 2017-10-31 PROCEDURE — 80048 BASIC METABOLIC PNL TOTAL CA: CPT

## 2017-10-31 PROCEDURE — 85014 HEMATOCRIT: CPT

## 2017-10-31 PROCEDURE — A9585: CPT

## 2017-10-31 PROCEDURE — 86900 BLOOD TYPING SEROLOGIC ABO: CPT

## 2017-10-31 PROCEDURE — 85027 COMPLETE CBC AUTOMATED: CPT

## 2017-10-31 PROCEDURE — C1713: CPT

## 2017-10-31 PROCEDURE — 83550 IRON BINDING TEST: CPT

## 2017-10-31 PROCEDURE — 82435 ASSAY OF BLOOD CHLORIDE: CPT

## 2017-10-31 PROCEDURE — 83935 ASSAY OF URINE OSMOLALITY: CPT

## 2017-10-31 PROCEDURE — 82330 ASSAY OF CALCIUM: CPT

## 2017-10-31 PROCEDURE — 97161 PT EVAL LOW COMPLEX 20 MIN: CPT

## 2017-10-31 PROCEDURE — 93005 ELECTROCARDIOGRAM TRACING: CPT

## 2017-10-31 PROCEDURE — 81025 URINE PREGNANCY TEST: CPT

## 2017-10-31 PROCEDURE — 85730 THROMBOPLASTIN TIME PARTIAL: CPT

## 2017-10-31 PROCEDURE — 84484 ASSAY OF TROPONIN QUANT: CPT

## 2017-10-31 PROCEDURE — 86901 BLOOD TYPING SEROLOGIC RH(D): CPT

## 2017-10-31 PROCEDURE — 88307 TISSUE EXAM BY PATHOLOGIST: CPT

## 2017-10-31 PROCEDURE — 83036 HEMOGLOBIN GLYCOSYLATED A1C: CPT

## 2017-10-31 RX ORDER — POLYETHYLENE GLYCOL 3350 17 G/17G
34 POWDER, FOR SOLUTION ORAL ONCE
Qty: 0 | Refills: 0 | Status: COMPLETED | OUTPATIENT
Start: 2017-10-31 | End: 2017-10-31

## 2017-10-31 RX ADMIN — Medication 50 MICROGRAM(S): at 05:14

## 2017-10-31 RX ADMIN — POLYETHYLENE GLYCOL 3350 34 GRAM(S): 17 POWDER, FOR SOLUTION ORAL at 10:40

## 2017-10-31 RX ADMIN — MIDODRINE HYDROCHLORIDE 5 MILLIGRAM(S): 2.5 TABLET ORAL at 13:30

## 2017-10-31 RX ADMIN — Medication 100 MILLIGRAM(S): at 05:14

## 2017-10-31 RX ADMIN — MIDODRINE HYDROCHLORIDE 5 MILLIGRAM(S): 2.5 TABLET ORAL at 05:14

## 2017-10-31 RX ADMIN — Medication 325 MILLIGRAM(S): at 11:26

## 2017-10-31 RX ADMIN — PANTOPRAZOLE SODIUM 40 MILLIGRAM(S): 20 TABLET, DELAYED RELEASE ORAL at 05:15

## 2017-10-31 RX ADMIN — Medication 100 MILLIGRAM(S): at 13:29

## 2017-10-31 RX ADMIN — Medication 650 MILLIGRAM(S): at 13:29

## 2017-10-31 RX ADMIN — LEVETIRACETAM 500 MILLIGRAM(S): 250 TABLET, FILM COATED ORAL at 05:14

## 2017-10-31 RX ADMIN — Medication 2 MILLIGRAM(S): at 05:14

## 2017-10-31 RX ADMIN — Medication 1 ENEMA: at 11:26

## 2017-10-31 NOTE — PROGRESS NOTE ADULT - ASSESSMENT
HPI:  40 yo Kazakh speaking female from [place], reports pain on right side of face for five years, has had wisdom teeth extractions and other dental work without relief. Pt. 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 Pt. presents to PST today for Right Petrosal Craniotomy, removal of epidermoid, harvest of fat graft on 10/24/17. Pt. continues to have pain on right side of face, sometimes is hard to talk, denies recent fever, chills, chest pain, SOB, changes in bowel/urinary habits.  Pt. had a physical at PCP's office, lab work from 10/11/17 revealed a TSH level of 6.17 pt. started levothyroxine 50 mcg yesterday 10/16/17. Spoke with VERONICA Bell to Dr. Thorpe, he is aware of TSH levels and that patient just started medication. Case d/w Dr. Gonzalez (17 Oct 2017 08:46)      s/p right petrosal craniotomy and resection of epidermoid lesion and fat graft closure 10/24

## 2017-10-31 NOTE — PROGRESS NOTE ADULT - SUBJECTIVE AND OBJECTIVE BOX
Subjective: Patient seen and examined. No new events except as noted.     SUBJECTIVE/ROS:  pt seen earlier  No chest pain, or sob.       MEDICATIONS:  MEDICATIONS  (STANDING):  carBAMazepine XR Tablet 400 milliGRAM(s) Oral at bedtime  dexamethasone     Tablet 2 milliGRAM(s) Oral every 12 hours  dexamethasone     Tablet   Oral   docusate sodium 100 milliGRAM(s) Oral three times a day  enoxaparin Injectable 40 milliGRAM(s) SubCutaneous <User Schedule>  ferrous    sulfate 325 milliGRAM(s) Oral daily  levETIRAcetam 500 milliGRAM(s) Oral two times a day  levothyroxine 50 MICROGram(s) Oral daily  midodrine 5 milliGRAM(s) Oral every 8 hours  pantoprazole    Tablet 40 milliGRAM(s) Oral before breakfast      PHYSICAL EXAM:  T(C): 36.7 (10-31-17 @ 07:49), Max: 36.8 (10-30-17 @ 19:12)  HR: 80 (10-31-17 @ 07:49) (72 - 80)  BP: 103/70 (10-31-17 @ 07:49) (101/71 - 111/76)  RR: 18 (10-31-17 @ 07:49) (18 - 18)  SpO2: 98% (10-31-17 @ 07:49) (96% - 98%)  Wt(kg): --  I&O's Summary    30 Oct 2017 07:01  -  31 Oct 2017 07:00  --------------------------------------------------------  IN: 520 mL / OUT: 0 mL / NET: 520 mL          Appearance: Normal	  HEENT:   Normal oral mucosa, PERRL, EOMI	  Cardiovascular: Normal S1 S2,    Murmur:   Neck: JVP normal  Respiratory: Lungs clear to auscultation  Gastrointestinal:  Soft, Non-tender, + BS	  Skin: normal   Neuro: No gross deficits.   Psychiatry:  Mood & affect appropriate  Ext: No edema      LABS/DATA:    CARDIAC MARKERS:                                8.9    15.2  )-----------( 448      ( 31 Oct 2017 10:31 )             26.7           proBNP:   Lipid Profile:   HgA1c:   TSH:     TELE:  EKG:

## 2017-10-31 NOTE — PROGRESS NOTE ADULT - SUBJECTIVE AND OBJECTIVE BOX
SUBJECTIVE: Patient was seen and evaluated at bedside. Patient is resting comfortably in bed and is in no new acute distress.     OVERNIGHT EVENTS: None     Vital Signs Last 24 Hrs  T(C): 36.7 (31 Oct 2017 07:49), Max: 36.8 (30 Oct 2017 19:12)  T(F): 98 (31 Oct 2017 07:49), Max: 98.2 (30 Oct 2017 19:12)  HR: 80 (31 Oct 2017 07:49) (72 - 83)  BP: 103/70 (31 Oct 2017 07:49) (97/63 - 111/76)  BP(mean): --  RR: 18 (31 Oct 2017 07:49) (18 - 18)  SpO2: 98% (31 Oct 2017 07:49) (96% - 99%)    PHYSICAL EXAM:    General: No Acute Distress     Neurological: Awake, alert oriented to person, place and time, Following Commands, PERRL, EOMI, Face Symmetrical, Speech Fluent, Moving all extremities, Muscle Strength normal in all four extremities, No Drift, Sensation to Light Touch Intact    Pulmonary: Clear to Auscultation, No Rales, No Rhonchi, No Wheezes     Cardiovascular: S1, S2, Regular Rate and Rhythm     Gastrointestinal: Soft, Nontender, Nondistended     Extremities: No calf tenderness     Incision: clean and dry     LABS:         Hemoglobin A1C, Whole Blood: 5.6 % (10-26 @ 04:19)      10-30 @ 07:01  -  10-31 @ 07:00  --------------------------------------------------------  IN: 520 mL / OUT: 0 mL / NET: 520 mL      DRAINS:     MEDICATIONS:  Antibiotics:    Neuro:  acetaminophen   Tablet. 650 milliGRAM(s) Oral every 6 hours PRN Mild Pain (1 - 3)  carBAMazepine XR Tablet 400 milliGRAM(s) Oral at bedtime  levETIRAcetam 500 milliGRAM(s) Oral two times a day  ondansetron Injectable 4 milliGRAM(s) IV Push every 6 hours PRN Nausea and/or Vomiting  oxyCODONE    5 mG/acetaminophen 325 mG 1 Tablet(s) Oral every 4 hours PRN Moderate Pain    Cardiac:  midodrine 5 milliGRAM(s) Oral every 8 hours    Pulm:    GI/:  docusate sodium 100 milliGRAM(s) Oral three times a day  pantoprazole    Tablet 40 milliGRAM(s) Oral before breakfast  polyethylene glycol 3350 34 Gram(s) Oral once  sodium biphosphate Rectal Enema 1 Enema Rectal once    Other:   dexamethasone     Tablet 2 milliGRAM(s) Oral every 12 hours  dexamethasone     Tablet   Oral   enoxaparin Injectable 40 milliGRAM(s) SubCutaneous <User Schedule>  ferrous    sulfate 325 milliGRAM(s) Oral daily  levothyroxine 50 MICROGram(s) Oral daily    DIET: [] Regular [] CCD [] Renal [] Puree [] Dysphagia [] Tube Feeds: regular diet     IMAGING: no new imaging

## 2017-10-31 NOTE — PROGRESS NOTE ADULT - PROBLEM SELECTOR PLAN 1
s/p resection   1 out of bed   2 continue PT   3 decadron taper   4 needs bm today   5 laxatives   6 continue keppra   7 tegretol -home med  8 dvt ppx sql scds   9 follow up cbc   10 dc home today

## 2017-11-03 ENCOUNTER — APPOINTMENT (OUTPATIENT)
Dept: SPINE | Facility: CLINIC | Age: 42
End: 2017-11-03
Payer: COMMERCIAL

## 2017-11-03 VITALS
RESPIRATION RATE: 17 BRPM | DIASTOLIC BLOOD PRESSURE: 74 MMHG | OXYGEN SATURATION: 88 % | HEART RATE: 81 BPM | BODY MASS INDEX: 33.01 KG/M2 | SYSTOLIC BLOOD PRESSURE: 108 MMHG | WEIGHT: 153 LBS | TEMPERATURE: 98.1 F | HEIGHT: 57 IN

## 2017-11-03 PROCEDURE — 99024 POSTOP FOLLOW-UP VISIT: CPT

## 2017-11-03 RX ORDER — CARBAMAZEPINE 100 MG/1
100 TABLET, EXTENDED RELEASE ORAL
Qty: 60 | Refills: 0 | Status: COMPLETED | COMMUNITY
Start: 2017-02-01 | End: 2017-11-03

## 2017-11-03 RX ORDER — OMEGA-3/DHA/EPA/FISH OIL 300-1000MG
1000 CAPSULE ORAL
Refills: 0 | Status: COMPLETED | COMMUNITY
End: 2017-11-03

## 2017-11-03 RX ORDER — TERCONAZOLE 8 MG/G
0.8 CREAM VAGINAL
Qty: 20 | Refills: 0 | Status: COMPLETED | COMMUNITY
Start: 2017-06-23 | End: 2017-11-03

## 2017-11-03 RX ORDER — BACLOFEN 10 MG/1
10 TABLET ORAL
Refills: 0 | Status: COMPLETED | COMMUNITY
End: 2017-11-03

## 2017-11-03 RX ORDER — CLONAZEPAM 0.5 MG/1
0.5 TABLET ORAL
Qty: 30 | Refills: 0 | Status: COMPLETED | COMMUNITY
Start: 2017-05-31 | End: 2017-11-03

## 2017-11-03 RX ORDER — TRAMADOL HYDROCHLORIDE 50 MG/1
50 TABLET, COATED ORAL
Qty: 120 | Refills: 0 | Status: COMPLETED | COMMUNITY
Start: 2017-05-31 | End: 2017-11-03

## 2017-11-03 RX ORDER — HYDROCORTISONE 10 MG/G
1 CREAM TOPICAL
Qty: 56 | Refills: 0 | Status: COMPLETED | COMMUNITY
Start: 2017-04-18 | End: 2017-11-03

## 2017-11-03 RX ORDER — CARBAMAZEPINE 100 MG/1
100 CAPSULE, EXTENDED RELEASE ORAL
Qty: 60 | Refills: 0 | Status: COMPLETED | COMMUNITY
Start: 2017-04-17 | End: 2017-11-03

## 2017-11-15 ENCOUNTER — APPOINTMENT (OUTPATIENT)
Dept: SPINE | Facility: CLINIC | Age: 42
End: 2017-11-15
Payer: COMMERCIAL

## 2017-11-15 VITALS
SYSTOLIC BLOOD PRESSURE: 108 MMHG | HEART RATE: 90 BPM | HEIGHT: 57 IN | DIASTOLIC BLOOD PRESSURE: 72 MMHG | BODY MASS INDEX: 33.01 KG/M2 | WEIGHT: 153 LBS

## 2017-11-15 PROCEDURE — 99024 POSTOP FOLLOW-UP VISIT: CPT

## 2017-11-15 RX ORDER — CARBAMAZEPINE 400 MG/1
400 TABLET, EXTENDED RELEASE ORAL
Refills: 0 | Status: DISCONTINUED | COMMUNITY
End: 2017-11-15

## 2017-11-15 RX ORDER — PANTOPRAZOLE 40 MG/1
40 TABLET, DELAYED RELEASE ORAL
Refills: 0 | Status: DISCONTINUED | COMMUNITY
End: 2017-11-15

## 2017-11-15 RX ORDER — LEVETIRACETAM 500 MG/1
500 TABLET, FILM COATED ORAL TWICE DAILY
Qty: 60 | Refills: 1 | Status: DISCONTINUED | COMMUNITY
End: 2017-11-15

## 2017-11-15 RX ORDER — FERROUS SULFATE TAB EC 325 MG (65 MG FE EQUIVALENT) 325 (65 FE) MG
325 (65 FE) TABLET DELAYED RESPONSE ORAL
Refills: 0 | Status: DISCONTINUED | COMMUNITY
End: 2017-11-15

## 2017-11-20 ENCOUNTER — OTHER (OUTPATIENT)
Age: 42
End: 2017-11-20

## 2017-12-01 ENCOUNTER — OTHER (OUTPATIENT)
Age: 42
End: 2017-12-01

## 2017-12-20 ENCOUNTER — APPOINTMENT (OUTPATIENT)
Dept: SPINE | Facility: CLINIC | Age: 42
End: 2017-12-20
Payer: COMMERCIAL

## 2017-12-20 VITALS
HEART RATE: 89 BPM | WEIGHT: 153 LBS | DIASTOLIC BLOOD PRESSURE: 77 MMHG | SYSTOLIC BLOOD PRESSURE: 117 MMHG | HEIGHT: 57 IN | BODY MASS INDEX: 33.01 KG/M2

## 2017-12-20 PROCEDURE — 99024 POSTOP FOLLOW-UP VISIT: CPT

## 2017-12-20 RX ORDER — OXYCODONE AND ACETAMINOPHEN 5; 325 MG/1; MG/1
5-325 TABLET ORAL
Refills: 0 | Status: DISCONTINUED | COMMUNITY
End: 2017-12-20

## 2017-12-26 ENCOUNTER — APPOINTMENT (OUTPATIENT)
Dept: ENDOCRINOLOGY | Facility: CLINIC | Age: 42
End: 2017-12-26

## 2017-12-28 ENCOUNTER — CLINICAL ADVICE (OUTPATIENT)
Age: 42
End: 2017-12-28

## 2017-12-28 ENCOUNTER — OTHER (OUTPATIENT)
Age: 42
End: 2017-12-28

## 2017-12-28 VITALS
HEIGHT: 57 IN | BODY MASS INDEX: 33.01 KG/M2 | WEIGHT: 153 LBS | SYSTOLIC BLOOD PRESSURE: 112 MMHG | DIASTOLIC BLOOD PRESSURE: 72 MMHG | HEART RATE: 89 BPM

## 2018-01-05 ENCOUNTER — CLINICAL ADVICE (OUTPATIENT)
Age: 43
End: 2018-01-05

## 2018-01-05 ENCOUNTER — OTHER (OUTPATIENT)
Age: 43
End: 2018-01-05

## 2018-01-11 ENCOUNTER — CLINICAL ADVICE (OUTPATIENT)
Age: 43
End: 2018-01-11

## 2018-01-11 VITALS — RESPIRATION RATE: 14 BRPM | DIASTOLIC BLOOD PRESSURE: 88 MMHG | HEART RATE: 82 BPM | SYSTOLIC BLOOD PRESSURE: 128 MMHG

## 2018-01-11 RX ORDER — MIDODRINE HYDROCHLORIDE 5 MG/1
5 TABLET ORAL EVERY 8 HOURS
Qty: 90 | Refills: 0 | Status: DISCONTINUED | COMMUNITY
End: 2018-01-11

## 2018-01-11 RX ORDER — LEVOTHYROXINE SODIUM 0.05 MG/1
50 TABLET ORAL DAILY
Qty: 30 | Refills: 0 | Status: ACTIVE | COMMUNITY
Start: 1900-01-01 | End: 1900-01-01

## 2018-01-18 ENCOUNTER — OTHER (OUTPATIENT)
Age: 43
End: 2018-01-18

## 2018-01-18 VITALS — HEART RATE: 72 BPM | DIASTOLIC BLOOD PRESSURE: 68 MMHG | SYSTOLIC BLOOD PRESSURE: 109 MMHG

## 2018-02-28 ENCOUNTER — APPOINTMENT (OUTPATIENT)
Dept: MRI IMAGING | Facility: IMAGING CENTER | Age: 43
End: 2018-02-28
Payer: MEDICAID

## 2018-02-28 ENCOUNTER — OUTPATIENT (OUTPATIENT)
Dept: OUTPATIENT SERVICES | Facility: HOSPITAL | Age: 43
LOS: 1 days | End: 2018-02-28
Payer: COMMERCIAL

## 2018-02-28 DIAGNOSIS — G83.0 DIPLEGIA OF UPPER LIMBS: ICD-10-CM

## 2018-02-28 PROCEDURE — 70553 MRI BRAIN STEM W/O & W/DYE: CPT | Mod: 26

## 2018-02-28 PROCEDURE — 70553 MRI BRAIN STEM W/O & W/DYE: CPT

## 2018-02-28 PROCEDURE — A9585: CPT

## 2018-03-05 ENCOUNTER — APPOINTMENT (OUTPATIENT)
Dept: ENDOCRINOLOGY | Facility: CLINIC | Age: 43
End: 2018-03-05

## 2018-03-28 ENCOUNTER — APPOINTMENT (OUTPATIENT)
Dept: SPINE | Facility: CLINIC | Age: 43
End: 2018-03-28
Payer: MEDICAID

## 2018-03-28 VITALS
HEART RATE: 89 BPM | SYSTOLIC BLOOD PRESSURE: 111 MMHG | DIASTOLIC BLOOD PRESSURE: 68 MMHG | HEIGHT: 57 IN | WEIGHT: 153 LBS | BODY MASS INDEX: 33.01 KG/M2

## 2018-03-28 PROCEDURE — 99214 OFFICE O/P EST MOD 30 MIN: CPT

## 2018-03-28 RX ORDER — CHLORHEXIDINE GLUCONATE 4 %
325 (65 FE) LIQUID (ML) TOPICAL
Qty: 30 | Refills: 0 | Status: COMPLETED | COMMUNITY
Start: 2018-02-26

## 2018-09-08 NOTE — PATIENT PROFILE ADULT. - NS PRO OT REFERRAL QUES 1 YN
"Sveta Coronel PP day 1 POD 1    Subjective: Abdominal pain. no, ambulating .yes, tolerating liquids .yes, tolerating regular diet .yes, flatus.yes, BM .no, Bleeding .yes, voiding .yes,dizziness .no, breast feeding.no, breast tenderness .no    Blood pressure 116/77, pulse 81, temperature 36.4 °C (97.5 °F), resp. rate 20, height 1.6 m (5' 3\"), weight 69.4 kg (153 lb), SpO2 95 %.  Breast Exam: Tenderness .no, Engourgement .no, Mastitis .no  Abdomen soft, non-tender. BS normal. No masses,  No organomegaly  Incision: healing well with good reapproximation, no evidence of infection, separation or keloid formation.  Fundus Tenderness:no, Below umbilicus:Yes,   Perineumperineum intact  Extremities2+ edema    Meds:   No current facility-administered medications on file prior to encounter.      No current outpatient prescriptions on file prior to encounter.       Lab:   Recent Results (from the past 48 hour(s))   CBC without differential    Collection Time: 09/08/18  5:06 AM   Result Value Ref Range    WBC 14.4 (H) 4.8 - 10.8 K/uL    RBC 3.71 (L) 4.20 - 5.40 M/uL    Hemoglobin 11.6 (L) 12.0 - 16.0 g/dL    Hematocrit 35.1 (L) 37.0 - 47.0 %    MCV 94.6 81.4 - 97.8 fL    MCH 31.3 27.0 - 33.0 pg    MCHC 33.0 (L) 33.6 - 35.0 g/dL    RDW 46.3 35.9 - 50.0 fL    Platelet Count 127 (L) 164 - 446 K/uL    MPV 11.1 9.0 - 12.9 fL       Assessment and Plan  normal postpartum course  No areas of skin breakdown/redness; surgical incision intact/healing    Continue Routine postpartum care  " no

## 2018-10-26 PROBLEM — G50.0 TRIGEMINAL NEURALGIA: Chronic | Status: ACTIVE | Noted: 2017-10-17

## 2018-10-26 PROBLEM — E03.9 HYPOTHYROIDISM, UNSPECIFIED: Chronic | Status: ACTIVE | Noted: 2017-10-17

## 2018-10-26 PROBLEM — D50.9 IRON DEFICIENCY ANEMIA, UNSPECIFIED: Chronic | Status: ACTIVE | Noted: 2017-10-17

## 2018-10-26 PROBLEM — G93.0 CEREBRAL CYSTS: Chronic | Status: ACTIVE | Noted: 2017-10-17

## 2018-10-26 PROBLEM — G93.0 CEREBRAL CYSTS: Chronic | Status: ACTIVE | Noted: 2017-08-29

## 2018-11-05 ENCOUNTER — APPOINTMENT (OUTPATIENT)
Dept: MRI IMAGING | Facility: IMAGING CENTER | Age: 43
End: 2018-11-05
Payer: MEDICAID

## 2018-11-05 ENCOUNTER — OUTPATIENT (OUTPATIENT)
Dept: OUTPATIENT SERVICES | Facility: HOSPITAL | Age: 43
LOS: 1 days | End: 2018-11-05
Payer: COMMERCIAL

## 2018-11-05 DIAGNOSIS — G93.0 CEREBRAL CYSTS: ICD-10-CM

## 2018-11-05 PROCEDURE — A9585: CPT

## 2018-11-05 PROCEDURE — 70553 MRI BRAIN STEM W/O & W/DYE: CPT

## 2018-11-05 PROCEDURE — 70553 MRI BRAIN STEM W/O & W/DYE: CPT | Mod: 26

## 2018-11-07 ENCOUNTER — APPOINTMENT (OUTPATIENT)
Dept: SPINE | Facility: CLINIC | Age: 43
End: 2018-11-07
Payer: MEDICAID

## 2018-11-07 VITALS
BODY MASS INDEX: 40.78 KG/M2 | HEART RATE: 87 BPM | HEIGHT: 57 IN | SYSTOLIC BLOOD PRESSURE: 117 MMHG | WEIGHT: 189 LBS | DIASTOLIC BLOOD PRESSURE: 74 MMHG

## 2018-11-07 PROCEDURE — 99214 OFFICE O/P EST MOD 30 MIN: CPT

## 2019-11-12 ENCOUNTER — RX RENEWAL (OUTPATIENT)
Age: 44
End: 2019-11-12

## 2019-11-27 ENCOUNTER — APPOINTMENT (OUTPATIENT)
Dept: MRI IMAGING | Facility: IMAGING CENTER | Age: 44
End: 2019-11-27
Payer: MEDICAID

## 2019-11-27 ENCOUNTER — OUTPATIENT (OUTPATIENT)
Dept: OUTPATIENT SERVICES | Facility: HOSPITAL | Age: 44
LOS: 1 days | End: 2019-11-27
Payer: MEDICAID

## 2019-11-27 DIAGNOSIS — G93.0 CEREBRAL CYSTS: ICD-10-CM

## 2019-11-27 PROCEDURE — 70553 MRI BRAIN STEM W/O & W/DYE: CPT

## 2019-11-27 PROCEDURE — 70553 MRI BRAIN STEM W/O & W/DYE: CPT | Mod: 26

## 2019-11-27 PROCEDURE — A9585: CPT

## 2019-12-11 ENCOUNTER — APPOINTMENT (OUTPATIENT)
Dept: SPINE | Facility: CLINIC | Age: 44
End: 2019-12-11
Payer: MEDICAID

## 2019-12-11 VITALS
WEIGHT: 189 LBS | HEIGHT: 57 IN | TEMPERATURE: 98.2 F | SYSTOLIC BLOOD PRESSURE: 108 MMHG | OXYGEN SATURATION: 97 % | DIASTOLIC BLOOD PRESSURE: 69 MMHG | BODY MASS INDEX: 40.78 KG/M2 | HEART RATE: 75 BPM

## 2019-12-11 PROCEDURE — 99213 OFFICE O/P EST LOW 20 MIN: CPT

## 2019-12-11 RX ORDER — GLUC/MSM/COLGN2/HYAL/ANTIARTH3 375-375-20
240 (27 FE) TABLET ORAL
Refills: 0 | Status: COMPLETED | COMMUNITY
End: 2019-12-11

## 2019-12-11 RX ORDER — UBIDECARENONE/VIT E ACET 100MG-5
CAPSULE ORAL
Refills: 0 | Status: COMPLETED | COMMUNITY
End: 2019-12-11

## 2020-09-24 ENCOUNTER — APPOINTMENT (OUTPATIENT)
Dept: SPINE | Facility: CLINIC | Age: 45
End: 2020-09-24
Payer: COMMERCIAL

## 2020-09-24 VITALS
HEART RATE: 92 BPM | DIASTOLIC BLOOD PRESSURE: 75 MMHG | RESPIRATION RATE: 14 BRPM | TEMPERATURE: 98.2 F | OXYGEN SATURATION: 98 % | HEIGHT: 57 IN | SYSTOLIC BLOOD PRESSURE: 114 MMHG | WEIGHT: 194 LBS | BODY MASS INDEX: 41.85 KG/M2

## 2020-09-24 PROCEDURE — 99213 OFFICE O/P EST LOW 20 MIN: CPT

## 2020-09-24 NOTE — REASON FOR VISIT
[Follow-Up: _____] : a [unfilled] follow-up visit [Spouse] : spouse [[employer] ] : provided by [employer]   [FreeTextEntry2] : Norberto [TWNoteComboBox1] : [place] [FreeTextEntry1] : MARIBELL VALENCIA is a 44 year old lady who underwent a right petrosal craniotomy approach for resection of an epidermoid mass and harvest of a fat graft on 10/24/ 2017 to treat a right posterior fossa petroclival area and cerebellopontine angle area epidermoid mass.BP she recovered well from the surgery with relief of her right facial pain. The last MRI of the brain with and without contrast on November of 2019 showed no evidence of tumor recurrence. The patient returns today reporting that she started with right-sided electrical pain in June. This radiates from her right lower area towards her mouth. She was seen by her dentist in July and had dental work done but continues with this pain. This occurs when eating and brushing her teeth. The pain today is a 2 on a scale of 0-10. She feels that the area of this pain is similar to the pain she had prior to surgery.

## 2020-09-24 NOTE — ASSESSMENT
[FreeTextEntry1] : It was discussed with the patient and her  that this may possibly be an irritation of the trigeminal nerve.  the patient denied wanting her needing any medication for this pain at this time. It is a possibility that the pain is coming from a dental problem although the patient has been seen by her dentist. It is recommended that she have a new MRI of the brain with and without contrast to evaluate for any recurrence of tumor growth. The patient and her  stated agreement and understanding of this plan and will return with the images for Dr. Thorpe to review.

## 2020-09-24 NOTE — PHYSICAL EXAM

## 2020-10-11 ENCOUNTER — APPOINTMENT (OUTPATIENT)
Dept: MRI IMAGING | Facility: CLINIC | Age: 45
End: 2020-10-11
Payer: COMMERCIAL

## 2020-10-11 ENCOUNTER — OUTPATIENT (OUTPATIENT)
Dept: OUTPATIENT SERVICES | Facility: HOSPITAL | Age: 45
LOS: 1 days | End: 2020-10-11
Payer: COMMERCIAL

## 2020-10-11 DIAGNOSIS — G93.0 CEREBRAL CYSTS: ICD-10-CM

## 2020-10-11 PROCEDURE — 70553 MRI BRAIN STEM W/O & W/DYE: CPT

## 2020-10-11 PROCEDURE — 70553 MRI BRAIN STEM W/O & W/DYE: CPT | Mod: 26

## 2020-10-11 PROCEDURE — A9585: CPT

## 2020-10-14 ENCOUNTER — APPOINTMENT (OUTPATIENT)
Dept: SPINE | Facility: CLINIC | Age: 45
End: 2020-10-14
Payer: COMMERCIAL

## 2020-10-14 VITALS
BODY MASS INDEX: 41.85 KG/M2 | WEIGHT: 194 LBS | HEIGHT: 57 IN | TEMPERATURE: 97.5 F | SYSTOLIC BLOOD PRESSURE: 134 MMHG | DIASTOLIC BLOOD PRESSURE: 87 MMHG | OXYGEN SATURATION: 100 % | HEART RATE: 86 BPM

## 2020-10-14 PROCEDURE — 99213 OFFICE O/P EST LOW 20 MIN: CPT

## 2020-11-21 ENCOUNTER — EMERGENCY (EMERGENCY)
Facility: HOSPITAL | Age: 45
LOS: 1 days | Discharge: HOME | End: 2020-11-21
Attending: EMERGENCY MEDICINE | Admitting: EMERGENCY MEDICINE
Payer: COMMERCIAL

## 2020-11-21 VITALS
DIASTOLIC BLOOD PRESSURE: 80 MMHG | SYSTOLIC BLOOD PRESSURE: 137 MMHG | HEIGHT: 57 IN | TEMPERATURE: 98 F | OXYGEN SATURATION: 100 % | RESPIRATION RATE: 18 BRPM | HEART RATE: 96 BPM

## 2020-11-21 LAB
ALBUMIN SERPL ELPH-MCNC: 4.2 G/DL — SIGNIFICANT CHANGE UP (ref 3.3–5)
ALP SERPL-CCNC: 68 U/L — SIGNIFICANT CHANGE UP (ref 40–120)
ALT FLD-CCNC: 35 U/L — HIGH (ref 4–33)
ANION GAP SERPL CALC-SCNC: 14 MMO/L — SIGNIFICANT CHANGE UP (ref 7–14)
AST SERPL-CCNC: 21 U/L — SIGNIFICANT CHANGE UP (ref 4–32)
BASOPHILS # BLD AUTO: 0.02 K/UL — SIGNIFICANT CHANGE UP (ref 0–0.2)
BASOPHILS NFR BLD AUTO: 0.2 % — SIGNIFICANT CHANGE UP (ref 0–2)
BILIRUB SERPL-MCNC: < 0.2 MG/DL — LOW (ref 0.2–1.2)
BUN SERPL-MCNC: 10 MG/DL — SIGNIFICANT CHANGE UP (ref 7–23)
CALCIUM SERPL-MCNC: 10.2 MG/DL — SIGNIFICANT CHANGE UP (ref 8.4–10.5)
CHLORIDE SERPL-SCNC: 101 MMOL/L — SIGNIFICANT CHANGE UP (ref 98–107)
CO2 SERPL-SCNC: 25 MMOL/L — SIGNIFICANT CHANGE UP (ref 22–31)
CREAT SERPL-MCNC: 0.85 MG/DL — SIGNIFICANT CHANGE UP (ref 0.5–1.3)
EOSINOPHIL # BLD AUTO: 0.07 K/UL — SIGNIFICANT CHANGE UP (ref 0–0.5)
EOSINOPHIL NFR BLD AUTO: 0.6 % — SIGNIFICANT CHANGE UP (ref 0–6)
GLUCOSE SERPL-MCNC: 112 MG/DL — HIGH (ref 70–99)
HCT VFR BLD CALC: 37.6 % — SIGNIFICANT CHANGE UP (ref 34.5–45)
HGB BLD-MCNC: 12 G/DL — SIGNIFICANT CHANGE UP (ref 11.5–15.5)
IMM GRANULOCYTES NFR BLD AUTO: 0.4 % — SIGNIFICANT CHANGE UP (ref 0–1.5)
LYMPHOCYTES # BLD AUTO: 2.26 K/UL — SIGNIFICANT CHANGE UP (ref 1–3.3)
LYMPHOCYTES # BLD AUTO: 20.7 % — SIGNIFICANT CHANGE UP (ref 13–44)
MCHC RBC-ENTMCNC: 24.3 PG — LOW (ref 27–34)
MCHC RBC-ENTMCNC: 31.9 % — LOW (ref 32–36)
MCV RBC AUTO: 76.1 FL — LOW (ref 80–100)
MONOCYTES # BLD AUTO: 0.73 K/UL — SIGNIFICANT CHANGE UP (ref 0–0.9)
MONOCYTES NFR BLD AUTO: 6.7 % — SIGNIFICANT CHANGE UP (ref 2–14)
NEUTROPHILS # BLD AUTO: 7.82 K/UL — HIGH (ref 1.8–7.4)
NEUTROPHILS NFR BLD AUTO: 71.4 % — SIGNIFICANT CHANGE UP (ref 43–77)
NRBC # FLD: 0 K/UL — SIGNIFICANT CHANGE UP (ref 0–0)
PLATELET # BLD AUTO: 325 K/UL — SIGNIFICANT CHANGE UP (ref 150–400)
PMV BLD: 9.2 FL — SIGNIFICANT CHANGE UP (ref 7–13)
POTASSIUM SERPL-MCNC: 3.7 MMOL/L — SIGNIFICANT CHANGE UP (ref 3.5–5.3)
POTASSIUM SERPL-SCNC: 3.7 MMOL/L — SIGNIFICANT CHANGE UP (ref 3.5–5.3)
PROT SERPL-MCNC: 7.7 G/DL — SIGNIFICANT CHANGE UP (ref 6–8.3)
RBC # BLD: 4.94 M/UL — SIGNIFICANT CHANGE UP (ref 3.8–5.2)
RBC # FLD: 16.9 % — HIGH (ref 10.3–14.5)
SODIUM SERPL-SCNC: 140 MMOL/L — SIGNIFICANT CHANGE UP (ref 135–145)
TSH SERPL-MCNC: 1.41 UIU/ML — SIGNIFICANT CHANGE UP (ref 0.27–4.2)
WBC # BLD: 10.94 K/UL — HIGH (ref 3.8–10.5)
WBC # FLD AUTO: 10.94 K/UL — HIGH (ref 3.8–10.5)

## 2020-11-21 PROCEDURE — 99284 EMERGENCY DEPT VISIT MOD MDM: CPT | Mod: 25

## 2020-11-21 PROCEDURE — 93010 ELECTROCARDIOGRAM REPORT: CPT

## 2020-11-21 PROCEDURE — 70450 CT HEAD/BRAIN W/O DYE: CPT | Mod: 26

## 2020-11-21 RX ORDER — MECLIZINE HCL 12.5 MG
25 TABLET ORAL ONCE
Refills: 0 | Status: COMPLETED | OUTPATIENT
Start: 2020-11-21 | End: 2020-11-21

## 2020-11-21 RX ORDER — MECLIZINE HCL 12.5 MG
1 TABLET ORAL
Qty: 9 | Refills: 0
Start: 2020-11-21 | End: 2020-11-23

## 2020-11-21 RX ORDER — SODIUM CHLORIDE 9 MG/ML
1000 INJECTION INTRAMUSCULAR; INTRAVENOUS; SUBCUTANEOUS ONCE
Refills: 0 | Status: COMPLETED | OUTPATIENT
Start: 2020-11-21 | End: 2020-11-21

## 2020-11-21 RX ADMIN — Medication 25 MILLIGRAM(S): at 18:02

## 2020-11-21 RX ADMIN — SODIUM CHLORIDE 1000 MILLILITER(S): 9 INJECTION INTRAMUSCULAR; INTRAVENOUS; SUBCUTANEOUS at 17:59

## 2020-11-21 NOTE — ED PROVIDER NOTE - PROGRESS NOTE DETAILS
GREG MONTERROSO:  Pt notes resolution of dizziness and nausea.  Pt ambulatory independently without difficulty.  Pt medically stable for discharge. Strict return precautions given.  Pt to follow up with PMD and neurology.  Reassessment performed and plan for discharge discussed with Dr. Sr who agrees with disposition and discharge plan.

## 2020-11-21 NOTE — ED PROVIDER NOTE - PHYSICAL EXAMINATION
-Cranial Nerves:  --CN II: PERRLA  --CN III, IV, VI: EOMI b/l  --CN V1-3: Facial sensation intact to touch  --CN VII: No facial asymmetry or droop  --CN VIII: Hearing intact to rubbing fingers b/l  --CN IX, X: Palate elevates symmetrically. Normal phonation  --CN XI: Heading turning and shoulder shrug intact b/l  --CN XII: Tongue midline with normal movements     Normal bilateral FTN and HTS.  Rapid alternating movements intact.     Mauricio hallpike triggers fatigable subjective dizziness.  No vertical or rotatory nystagmus.

## 2020-11-21 NOTE — ED PROVIDER NOTE - CLINICAL SUMMARY MEDICAL DECISION MAKING FREE TEXT BOX
Pt is a 46 y/o F PMhx hypothyroidism, brain tumor s/p elective right posterior fossa epidermoid tumor resection p/w dizziness today -- likely BPPV -- labs, ct head, tsh, ekg, meclizine, fluids

## 2020-11-21 NOTE — ED PROVIDER NOTE - NSFOLLOWUPINSTRUCTIONS_ED_ALL_ED_FT
Advance activity as tolerated.  Continue all previously prescribed medications as directed unless otherwise instructed.  Take Meclizine 25 mg three times a day as needed for dizziness -- may cause drowsiness; DO NOT DRINK ALCOHOL, DRIVE, OR OPERATE HEAVY MACHINERY while taking this medication.  Follow up with your primary care physician and neurologist in 48-72 hours- bring copies of your results.  Return to the ER for worsening or persistent symptoms, including but not limited to worsening/persistent dizziness, headache, vomiting, difficulty walking, falls, and/or ANY NEW OR CONCERNING SYMPTOMS. If you have issues obtaining follow up, please call: [phone] to obtain a doctor or specialist who takes your insurance in your area.  You may call [phone] to make an appointment with the internal medicine clinic.

## 2020-11-21 NOTE — ED PROVIDER NOTE - ATTENDING CONTRIBUTION TO CARE
Attending Statement: I have reviewed and agree with all pertinent clinical information, including history and physical exam and agree with treatment plan of the PA, except as noted.  44 y/o F PMhx hypothyroidism, brain tumor s/p elective right posterior fossa epidermoid tumor resection p/w dizziness today. Endorse spinning sensation wosre when move head or change position. no nausea or vomit no focal weakness or numbness no slurred speech no double vision no cp or sob no abdominal pain. no trauma Had a MRI last month states "it was no tumor" follows w neurosurgery no other complaints  Vital signs noted.  EOMI. no facial asymmetry supple neck normal S1-S2 No resp distress. able to speak in full and clear sentences. no wheeze, rales or stridor. obese female nt abdomen. AOx3, no focal deficits. CN 2-12 grossly intact. nl gait. no meningeal signs.   plan labs, ekg, ct head, meds, re assess

## 2020-11-21 NOTE — ED ADULT TRIAGE NOTE - CHIEF COMPLAINT QUOTE
Arrives from home with  reports dizziness since this morning worse when moving around. Reports nausea but not vomiting, PMH brain tumor in remission

## 2020-11-21 NOTE — ED PROVIDER NOTE - PMH
Epidermoid cyst of brain  Dx 5/2017 on MRI  Epidermoid cyst of brain    Hypothyroid  Dx 10/2017  Iron deficiency anemia    Trigeminal neuralgia of right side of face

## 2020-11-21 NOTE — ED PROVIDER NOTE - PATIENT PORTAL LINK FT
You can access the FollowMyHealth Patient Portal offered by [facility] by registering at the following website: [email or web address]. By joining [facility]’s FollowMyHealth portal, you will also be able to view your health information using other applications (apps) compatible with our system.

## 2020-11-21 NOTE — ED PROVIDER NOTE - OBJECTIVE STATEMENT
Pt is a 46 y/o F PMhx hypothyroidism, brain tumor s/p elective right posterior fossa epidermoid tumor resection p/w dizziness today.  Pt states she has had intermittent room spinning dizziness which occurs when turning head in either direction and with bending forward.  She notes symptoms resolve when sitting or laying still.  She notes associated nausea.  Presently, dizziness improved compared to earlier in the day.  She also notes having intermitting electricity pain for past 5 months at right lower aspect of face, intermittently, w/o any specific triggering, aggravating or alleviating factors.  She saw neurosurgeon Dr. Thorpe for these symptoms last month, for which pt had an MRI which pt states was normal.  She saw her PMD earlier this week and was told to see a neurologist, with whom pt has an appointment on 12/1/20.  Pt denies any fevers, chills, vomiting, headache, neck pain, chest pain, palpitations, SOB, falls, changes in vision/hearing, ear pain. Pt is a 46 y/o F PMhx hypothyroidism, brain tumor s/p elective right posterior fossa epidermoid tumor resection p/w dizziness today.  Pt speaks english and Lithuanian; she declines use of  phone at this time.  She agrees to use free  services whenever she would like to.  Pt states she has had intermittent room spinning dizziness which occurs when turning head in either direction and with bending forward.  She notes symptoms resolve when sitting or laying still.  She notes associated nausea.  Presently, dizziness improved compared to earlier in the day.  She also notes having intermitting electricity pain for past 5 months at right lower aspect of face, intermittently, w/o any specific triggering, aggravating or alleviating factors.  She saw neurosurgeon Dr. Thorpe for these symptoms last month, for which pt had an MRI which pt states was normal.  She saw her PMD earlier this week and was told to see a neurologist, with whom pt has an appointment on 12/1/20.  Pt denies any fevers, chills, vomiting, headache, neck pain, chest pain, palpitations, SOB, falls, changes in vision/hearing, ear pain.

## 2021-12-16 ENCOUNTER — APPOINTMENT (OUTPATIENT)
Dept: SPINE | Facility: CLINIC | Age: 46
End: 2021-12-16
Payer: COMMERCIAL

## 2021-12-16 VITALS — OXYGEN SATURATION: 94 % | SYSTOLIC BLOOD PRESSURE: 128 MMHG | HEART RATE: 85 BPM | DIASTOLIC BLOOD PRESSURE: 85 MMHG

## 2021-12-16 VITALS — BODY MASS INDEX: 40.99 KG/M2 | HEIGHT: 57 IN | WEIGHT: 190 LBS

## 2021-12-16 PROCEDURE — 99212 OFFICE O/P EST SF 10 MIN: CPT

## 2021-12-16 NOTE — REASON FOR VISIT
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  She recovered well from this.  She returns today stating that she started with a shocking type of pain in the area of her right lower molar after doing a cardio work out.  She stated that this occurred last year also after working out.  The patient has been followed by pain management specialist and started taking Carbamazepine 200 mg ER twice a day which is helping a little.  The pain is not constant but comes and goes.\par

## 2021-12-16 NOTE — PHYSICAL EXAM
[General Appearance - Alert] : alert [General Appearance - In No Acute Distress] : in no acute distress [General Appearance - Well Nourished] : well nourished [General Appearance - Well Developed] : well developed [General Appearance - Well-Appearing] : healthy appearing [] : normal voice and communication [Person] : oriented to person [Place] : oriented to place [Time] : oriented to time [Short Term Intact] : short term memory intact [Remote Intact] : remote memory intact [Span Intact] : the attention span was normal [Concentration Intact] : normal concentrating ability [Fluency] : fluency intact [Comprehension] : comprehension intact [Current Events] : adequate knowledge of current events [Past History] : adequate knowledge of personal past history [Vocabulary] : adequate range of vocabulary [Cranial Nerves Optic (II)] : visual acuity intact bilaterally,  pupils equal round and reactive to light [Cranial Nerves Oculomotor (III)] : extraocular motion intact [Cranial Nerves Trigeminal (V)] : facial sensation intact symmetrically [Cranial Nerves Facial (VII)] : face symmetrical [Cranial Nerves Vestibulocochlear (VIII)] : hearing was intact bilaterally [Cranial Nerves Glossopharyngeal (IX)] : tongue and palate midline [Cranial Nerves Accessory (XI - Cranial And Spinal)] : head turning and shoulder shrug symmetric [Cranial Nerves Hypoglossal (XII)] : there was no tongue deviation with protrusion [Motor Tone] : muscle tone was normal in all four extremities [Motor Strength] : muscle strength was normal in all four extremities [No Muscle Atrophy] : normal bulk in all four extremities [Sensation Tactile Decrease] : light touch was intact [Abnormal Walk] : normal gait [Balance] : balance was intact [2+] : Patella left 2+ [FreeTextEntry1] : S [Past-pointing] : there was no past-pointing [Tremor] : no tremor present

## 2021-12-16 NOTE — ASSESSMENT
[FreeTextEntry1] : It was discussed that the jarring type of activity may be aggravating her pain.  It was suggested that she do non jarring types of exercises.  She was advised to continue taking the Carbamazepine and follow up with Dr. Sedrick Herbert.  It has been more than a year since her last MRI and it was recommended that she have a new MRI with and without to evaluate for any evidence of recurrent tumor which may be causing her pain.  She is to return to the office with the images for Dr. Thorpe to review.

## 2021-12-16 NOTE — CONSULT LETTER
[Dear  ___] : Dear  [unfilled], [Courtesy Letter:] : I had the pleasure of seeing your patient, [unfilled], in my office today. [Please see my note below.] : Please see my note below. [Consult Closing:] : Thank you very much for allowing me to participate in the care of this patient.  If you have any questions, please do not hesitate to contact me. [Sincerely,] : Sincerely, [FreeTextEntry2] : Darren Junior M.D.\par [address] [place] [address]\[place] [id number] [FreeTextEntry1] : Kasey Calderon\par  1975 [FreeTextEntry3] : ROGER Taylor\par Nurse Practitioner\par Neurosurgery and Spine Department\par [facility]\par Nurse Practitioner in the office with Dr. Claudio Thorpe M.D.

## 2022-01-16 ENCOUNTER — APPOINTMENT (OUTPATIENT)
Dept: MRI IMAGING | Facility: IMAGING CENTER | Age: 47
End: 2022-01-16

## 2022-01-16 ENCOUNTER — RESULT REVIEW (OUTPATIENT)
Age: 47
End: 2022-01-16

## 2022-01-16 ENCOUNTER — OUTPATIENT (OUTPATIENT)
Dept: OUTPATIENT SERVICES | Facility: HOSPITAL | Age: 47
LOS: 1 days | End: 2022-01-16
Payer: COMMERCIAL

## 2022-01-16 DIAGNOSIS — G93.0 CEREBRAL CYSTS: ICD-10-CM

## 2022-01-16 PROCEDURE — 70553 MRI BRAIN STEM W/O & W/DYE: CPT | Mod: 26

## 2022-01-16 PROCEDURE — 70553 MRI BRAIN STEM W/O & W/DYE: CPT

## 2022-01-16 PROCEDURE — A9585: CPT

## 2022-01-21 NOTE — ED PROVIDER NOTE - PR
1/21/2022        To Whom it may Concern:    Roshan Kenny had a PPD (tuberculin skin test) placed on the right forearm on 1/19/22 .  The results were read on 1/21/22 by Elisha Flynn DNP    RESULTS:  0 mm induration noted    Signed,      Elisha Flynn DNP    [address] [facility] [address]  [phone]  
140

## 2022-01-26 ENCOUNTER — APPOINTMENT (OUTPATIENT)
Dept: SPINE | Facility: CLINIC | Age: 47
End: 2022-01-26
Payer: COMMERCIAL

## 2022-01-26 VITALS
SYSTOLIC BLOOD PRESSURE: 128 MMHG | OXYGEN SATURATION: 96 % | HEIGHT: 57 IN | BODY MASS INDEX: 40.99 KG/M2 | WEIGHT: 190 LBS | HEART RATE: 93 BPM | DIASTOLIC BLOOD PRESSURE: 84 MMHG

## 2022-01-26 PROCEDURE — 99213 OFFICE O/P EST LOW 20 MIN: CPT

## 2022-01-26 NOTE — ED ADULT NURSE NOTE - NSFALLRSKASSESASSIST_ED_ALL_ED
Complex Case Management: Lower Back Pain/Lumbar Radiculopathy f/u-CHF Mailing f/u    Ambulatory Care Management Note      Date/Time:  1/26/2022 1:17 PM    This patient was received as a referral from [facility]. Top Challenges reviewed with the provider   Did receive Booster; needs to be updated. Ambulatory  contacted patient for discussion and case management of Heart Failure & Back Pain. Summary of patients top problems: patient has a history of non-ischemic cardiomyopathy, chronic AF s/p AV kathleen ablation with BiV PPM.  She has had multiple myeloma, past Pulmonary Embolism & HTN. ACM will f/u with:   1. BP management  2. Dietary & Lifestyle changes-discussed receiving Holiday CHF information mailed. 3.  Continued medication compliance along with Provider follow-up appointments. Patient's challenges to self management identified:   medical condition      Medication Management:  good adherence    Advance Care Planning:   Does patient have an Advance Directive:  currently not on file; education provided  Julian Michel Nicolas remains Healthcare Decision Maker. [employer], Referrals, and Durable Medical Equipment: cardiologist specialist     PCP/Specialist follow up:   Future Appointments   Date Time Provider Belen Russell   3/23/2022  9:00 AM Rosie Taylor MD [facility] MAIN BS AMB   3/31/2022  9:45 AM REMOTE_RCA [facility] BS AMB   6/27/2022  9:00 AM Dianna Trevino MD [facility] BS AMB   10/20/2022  9:30 AM PACEMAKER, RCA [facility] BS AMB   10/20/2022  9:40 AM Nidia James, ANP [facility] BS AMB          Goals      Patient verbalizes understanding of self-management goals of living with Congestive Heart Failure      12/15/2021:  Macy Fontana on CHF to be done at next contact.   Misty Scheuermann Patient verbalizes understanding of self-management goals of living with Congestive Heart Failure      HF [facility] Follow-up: Diet       Patient/Family verbalizes understanding of self-management of chronic disease. 11/4/2021:  Next call: CHF & Pain Management Interventions. Consult done w/ PCP re Lower Back Pain PMH & present condition. JOSÉ ROCHE    11/24/2021:  Patient reports attended Urgent Care Center due to lower back pain and radiating to lower abdomen. Reports completion of Keflex 7 day regime as ordered. Reports reduced pain. Agrees to continuing adequate water intake. Reports drinking lots of water and cranberry juice; denies drinking soft drinks. Agrees to follow-up with PCP within week as recommended at Urgent Care. Reports plans to follow-up with cancer specialist in the very near future. [facility]         Patient verbalized understanding of all information discussed. Patient has this Ambulatory Care Manager's contact information for any further questions, concerns, or needs. next contact: goals & cooking style changes. Agrees to review [facility] Holiday mailing for next call. no

## 2022-02-22 NOTE — PATIENT PROFILE ADULT. - HEALTHCARE QUESTIONS, PROFILE
balaji No Repair - Repaired With Adjacent Surgical Defect Text (Leave Blank If You Do Not Want): After obtaining clear surgical margins the defect was repaired concurrently with another surgical defect which was in close approximation.

## 2022-07-11 NOTE — H&P PST ADULT - PROBLEM SELECTOR PLAN 2
oriented to person, place and time Left message at Dr. Arellano's office and awaiting call back, pt. recently diagnosed with hypothyroid and started medication on 10/16/17

## 2022-08-11 NOTE — DISCHARGE NOTE ADULT - DISCHARGE DATE
· MODESTO noted on initial admission to [facility] with elevated Cr 1 64, now resolved  · Baseline Cr being 0 9-1 0  · Continue to monitor closely   Losartan on hold  · Avoid nephrotoxic agents 30-Oct-2017 31-Oct-2017

## 2023-08-01 ENCOUNTER — EMERGENCY (EMERGENCY)
Facility: HOSPITAL | Age: 48
LOS: 1 days | Discharge: HOME | End: 2023-08-01
Attending: EMERGENCY MEDICINE
Payer: COMMERCIAL

## 2023-08-01 VITALS
DIASTOLIC BLOOD PRESSURE: 82 MMHG | HEART RATE: 99 BPM | TEMPERATURE: 98 F | WEIGHT: 188.05 LBS | HEIGHT: 57 IN | SYSTOLIC BLOOD PRESSURE: 136 MMHG | OXYGEN SATURATION: 96 % | RESPIRATION RATE: 20 BRPM

## 2023-08-01 VITALS
TEMPERATURE: 98 F | OXYGEN SATURATION: 99 % | DIASTOLIC BLOOD PRESSURE: 72 MMHG | RESPIRATION RATE: 15 BRPM | HEART RATE: 68 BPM | SYSTOLIC BLOOD PRESSURE: 130 MMHG

## 2023-08-01 PROCEDURE — 99284 EMERGENCY DEPT VISIT MOD MDM: CPT

## 2023-08-01 PROCEDURE — 99283 EMERGENCY DEPT VISIT LOW MDM: CPT | Mod: 25

## 2023-08-01 PROCEDURE — 96372 THER/PROPH/DIAG INJ SC/IM: CPT

## 2023-08-01 RX ORDER — KETOROLAC TROMETHAMINE 30 MG/ML
30 SYRINGE (ML) INJECTION ONCE
Refills: 0 | Status: DISCONTINUED | OUTPATIENT
Start: 2023-08-01 | End: 2023-08-01

## 2023-08-01 RX ORDER — GABAPENTIN 400 MG/1
1 CAPSULE ORAL
Qty: 42 | Refills: 0
Start: 2023-08-01 | End: 2023-08-14

## 2023-08-01 RX ORDER — GABAPENTIN 400 MG/1
300 CAPSULE ORAL ONCE
Refills: 0 | Status: COMPLETED | OUTPATIENT
Start: 2023-08-01 | End: 2023-08-01

## 2023-08-01 RX ORDER — ACETAMINOPHEN 500 MG
975 TABLET ORAL ONCE
Refills: 0 | Status: COMPLETED | OUTPATIENT
Start: 2023-08-01 | End: 2023-08-01

## 2023-08-01 RX ORDER — CARBAMAZEPINE 200 MG
300 TABLET ORAL ONCE
Refills: 0 | Status: COMPLETED | OUTPATIENT
Start: 2023-08-01 | End: 2023-08-01

## 2023-08-01 RX ADMIN — Medication 30 MILLIGRAM(S): at 14:20

## 2023-08-01 RX ADMIN — GABAPENTIN 300 MILLIGRAM(S): 400 CAPSULE ORAL at 14:54

## 2023-08-01 RX ADMIN — Medication 300 MILLIGRAM(S): at 14:54

## 2023-08-01 RX ADMIN — Medication 975 MILLIGRAM(S): at 14:20

## 2023-08-01 NOTE — ED ADULT NURSE NOTE - OBJECTIVE STATEMENT
48 yo female with a PMH of hypothyroidism, iron deficiency anemia, R trigeminal neuralgia s/p surgery 7 years ago [removal of benign tumor in brain], TMJ presents to the ED ambulatory with steady gait complaining of R facial pain x 2 months. Patient reports that she has been experiencing "electricity like" pain on the R side of her face. Last visit with surgeon was in January of 2022. Spoke with neurologist last week and refilled her carbamazepine last Tuesday with no relief. Patient reports she tried to take a pill today but as soon as she opened her mouth felt "very bad electricity like pain." Neurovascularly intact at this time, PERRL. Sensations intact B/L and equal. 5/5 strength in all extremities. Denies headache, dizziness, vision changes, chest pain, shortness of breath, abdominal pain, nausea, vomiting, diarrhea, fevers, chills, dysuria, hematuria, recent illness travel or fall.

## 2023-08-01 NOTE — ED PROVIDER NOTE - PROGRESS NOTE DETAILS
Wing Thomas PA-C: Patient reassessed without any new or evolving complaints.  Pain improved.  All results discharge instructions and return precautions given. Gabapentin sent to confirm patient pharmacy.  Patient will follow-up with her neurologist for MRI and patient given contact info for pain management office as well.

## 2023-08-01 NOTE — ED PROVIDER NOTE - NSICDXPASTMEDICALHX_GEN_ALL_CORE_FT
PAST MEDICAL HISTORY:  Epidermoid cyst of brain     Epidermoid cyst of brain Dx 5/2017 on MRI    Hypothyroid Dx 10/2017    Iron deficiency anemia     Trigeminal neuralgia of right side of face

## 2023-08-01 NOTE — ED ADULT NURSE NOTE - NSFALLUNIVINTERV_ED_ALL_ED
Bed/Stretcher in lowest position, wheels locked, appropriate side rails in place/Call bell, personal items and telephone in reach/Instruct patient to call for assistance before getting out of bed/chair/stretcher/Non-slip footwear applied when patient is off stretcher/[place] to call system/Physically safe environment - no spills, clutter or unnecessary equipment/Purposeful proactive rounding/Room/bathroom lighting operational, light cord in reach

## 2023-08-01 NOTE — ED PROVIDER NOTE - PHYSICAL EXAMINATION
GEN: Pt non-toxic in NAD, alert.  PSYCH: Affect and mood appropriate.  EYES: Sclera white w/o injection, EOMI, PERRLA.   ENT: +R preauricular ttp over prior surgical incision scar w/o swelling or erythema. Neck supple. Airway patent.  RESP: CTA b/l, no wheezes, rales, or rhonchi.   CARDIAC: RRR, clear distinct S1, S2, no appreciable murmurs.  ABD: Soft, non-tender.  MSK: CALDERON. FROM throughout.  NEURO: No focal motor or sensory deficits.  VASC: Strong distal pulses. No edema.  SKIN: No rashes or lesions.

## 2023-08-01 NOTE — ED PROVIDER NOTE - OBJECTIVE STATEMENT
47y F pmhx hypothyroidism, brain tumor s/p elective right posterior fossa epidermoid tumor resection 2017 [facility] Dr. Thorpe, presents to ED accompanied by daughter c/o R sided "shock-like" facial pain radiating from temple to her mouth and jaw x months worsening in severity in recent weeks. Today pain worse when moving her jaw. Follows with neurology, prescribed Carbamazepine 300mg bid the first week of July but has yet to start taking. Has not taken anything for symptoms. Got approval for outp MRI today. Denies f/c, vision changes, hearing changes, nausea, vomiting, neck pain, rashes.  Of note pt has documented hx of trigeminal neuralgia in chart.

## 2023-08-01 NOTE — ED PROVIDER NOTE - PATIENT PORTAL LINK FT
You can access the FollowMyHealth Patient Portal offered by [facility] by registering at the following website: [email or web address]. By joining [employer]’s FollowMyHealth portal, you will also be able to view your health information using other applications (apps) compatible with our system.

## 2023-08-01 NOTE — ED PROVIDER NOTE - NSFOLLOWUPINSTRUCTIONS_ED_ALL_ED_FT
1) Follow-up with your primary care provider in 1-2 days. Follow-up with your neurologist Dr. Herbert as scheduled for your MRI.    Additionally follow-up with pain management as needed for assessment of your chronic pain.    Matthew Ontiveros.  Neurology/Pain management  [address] [facility], [address]  [place] [address]  Phone: [phone]  Fax: [phone]    2) Continue to take all medications as prescribed including Carbamazepine. Take gabapentin 300mg three times a day as prescribed.    3) Pain can also be managed with Acetaminophen (aka Tylenol) 1000mg every 6 hours and Ibuprofen (aka Motrin or Advil) 600mg every 6 hours over the counter as directed. Take with food.    4) Return to the ER for any new or worsening symptoms.

## 2023-08-01 NOTE — ED PROVIDER NOTE - CLINICAL SUMMARY MEDICAL DECISION MAKING FREE TEXT BOX
Sonja: Patient is a 47-year-old with a history of neurosurgical intervention for nonmalignant tumor.  In 2017.  Patient now with right-sided facial pain that is sharp and intense and makes it very difficult for her to sweep or open her jaw.  Patient has had this pain on and off for 6 months and seems to be getting worse over the last few days.  Patient today was approved for an MRI to get a better look at what is going on.  Patient currently sees neurology for this pain and is on carbamazepine.  Will discuss pain control options and see if we can make the patient feel better.  CT scan in the emergency department was considered but I do not feel that it would add anything in the setting of an MRI which is a better test which the patient has scheduled as an outpatient.  Patient has no fever no nausea or vomiting just intense pain.

## 2023-08-09 ENCOUNTER — APPOINTMENT (OUTPATIENT)
Dept: SPINE | Facility: CLINIC | Age: 48
End: 2023-08-09
Payer: COMMERCIAL

## 2023-08-09 VITALS
HEART RATE: 79 BPM | WEIGHT: 190 LBS | SYSTOLIC BLOOD PRESSURE: 115 MMHG | BODY MASS INDEX: 40.99 KG/M2 | HEIGHT: 57 IN | DIASTOLIC BLOOD PRESSURE: 77 MMHG | OXYGEN SATURATION: 99 %

## 2023-08-09 PROCEDURE — 99213 OFFICE O/P EST LOW 20 MIN: CPT

## 2023-08-09 RX ORDER — CARBAMAZEPINE 200 MG/1
200 TABLET, EXTENDED RELEASE ORAL
Refills: 0 | Status: DISCONTINUED | COMMUNITY
End: 2023-08-09

## 2023-09-06 ENCOUNTER — APPOINTMENT (OUTPATIENT)
Dept: SPINE | Facility: CLINIC | Age: 48
End: 2023-09-06
Payer: COMMERCIAL

## 2023-09-06 VITALS
WEIGHT: 190 LBS | OXYGEN SATURATION: 98 % | SYSTOLIC BLOOD PRESSURE: 111 MMHG | DIASTOLIC BLOOD PRESSURE: 69 MMHG | HEART RATE: 100 BPM | HEIGHT: 57 IN | BODY MASS INDEX: 40.99 KG/M2

## 2023-09-06 DIAGNOSIS — G50.1 ATYPICAL FACIAL PAIN: ICD-10-CM

## 2023-09-06 DIAGNOSIS — G93.0 CEREBRAL CYSTS: ICD-10-CM

## 2023-09-06 PROCEDURE — 99212 OFFICE O/P EST SF 10 MIN: CPT

## 2023-09-06 RX ORDER — GABAPENTIN 300 MG
300 TABLET ORAL 3 TIMES DAILY
Refills: 0 | Status: DISCONTINUED | COMMUNITY
End: 2023-09-06

## 2023-09-06 RX ORDER — CARBAMAZEPINE 400 MG/1
400 TABLET, EXTENDED RELEASE ORAL
Refills: 0 | Status: ACTIVE | COMMUNITY

## 2023-09-06 RX ORDER — GABAPENTIN 300 MG/1
300 CAPSULE ORAL DAILY
Refills: 0 | Status: ACTIVE | COMMUNITY

## 2023-09-06 RX ORDER — OXYCODONE AND ACETAMINOPHEN 5; 325 MG/1; MG/1
5-325 TABLET ORAL
Refills: 0 | Status: ACTIVE | COMMUNITY

## 2023-09-06 RX ORDER — DULOXETINE HYDROCHLORIDE 30 MG/1
30 CAPSULE, DELAYED RELEASE PELLETS ORAL DAILY
Refills: 0 | Status: ACTIVE | COMMUNITY

## 2023-09-06 RX ORDER — CARBAMAZEPINE 300 MG/1
300 CAPSULE, EXTENDED RELEASE ORAL
Refills: 0 | Status: DISCONTINUED | COMMUNITY
End: 2023-09-06

## 2024-09-10 ENCOUNTER — NON-APPOINTMENT (OUTPATIENT)
Age: 49
End: 2024-09-10

## 2024-09-11 ENCOUNTER — APPOINTMENT (OUTPATIENT)
Dept: SPINE | Facility: CLINIC | Age: 49
End: 2024-09-11
Payer: COMMERCIAL

## 2024-09-11 VITALS
SYSTOLIC BLOOD PRESSURE: 111 MMHG | BODY MASS INDEX: 39.27 KG/M2 | HEART RATE: 99 BPM | HEIGHT: 60 IN | DIASTOLIC BLOOD PRESSURE: 75 MMHG | WEIGHT: 200 LBS | OXYGEN SATURATION: 95 %

## 2024-09-11 DIAGNOSIS — G93.0 CEREBRAL CYSTS: ICD-10-CM

## 2024-09-11 PROCEDURE — 99213 OFFICE O/P EST LOW 20 MIN: CPT

## 2024-09-11 RX ORDER — GABAPENTIN 400 MG
400 TABLET ORAL
Refills: 0 | Status: ACTIVE | COMMUNITY